# Patient Record
Sex: MALE | Race: WHITE | NOT HISPANIC OR LATINO | Employment: OTHER | URBAN - METROPOLITAN AREA
[De-identification: names, ages, dates, MRNs, and addresses within clinical notes are randomized per-mention and may not be internally consistent; named-entity substitution may affect disease eponyms.]

---

## 2021-03-19 ENCOUNTER — OFFICE VISIT (OUTPATIENT)
Dept: CARDIOLOGY | Facility: CLINIC | Age: 76
End: 2021-03-19

## 2021-03-19 VITALS
OXYGEN SATURATION: 98 % | DIASTOLIC BLOOD PRESSURE: 104 MMHG | HEART RATE: 62 BPM | WEIGHT: 183 LBS | HEIGHT: 70 IN | SYSTOLIC BLOOD PRESSURE: 200 MMHG | BODY MASS INDEX: 26.2 KG/M2

## 2021-03-19 DIAGNOSIS — I49.3 VENTRICULAR ECTOPY: ICD-10-CM

## 2021-03-19 DIAGNOSIS — I10 ESSENTIAL HYPERTENSION: Primary | ICD-10-CM

## 2021-03-19 PROBLEM — R94.31 ABNORMAL EKG: Status: ACTIVE | Noted: 2021-03-19

## 2021-03-19 PROCEDURE — 93000 ELECTROCARDIOGRAM COMPLETE: CPT | Performed by: INTERNAL MEDICINE

## 2021-03-19 PROCEDURE — 99204 OFFICE O/P NEW MOD 45 MIN: CPT | Performed by: INTERNAL MEDICINE

## 2021-03-19 RX ORDER — MAGNESIUM 30 MG
30 TABLET ORAL 2 TIMES DAILY
COMMUNITY
End: 2021-08-06

## 2021-03-19 RX ORDER — MULTIPLE VITAMINS W/ MINERALS TAB 9MG-400MCG
1 TAB ORAL DAILY
COMMUNITY

## 2021-03-19 RX ORDER — LORATADINE 10 MG/1
CAPSULE, LIQUID FILLED ORAL
COMMUNITY

## 2021-03-19 RX ORDER — CHLORAL HYDRATE 500 MG
CAPSULE ORAL
COMMUNITY
End: 2021-08-06

## 2021-03-19 NOTE — PROGRESS NOTES
Reason for consultation:Abnormal EKG     Referring physician: None    HPI. Hadley Astudillo is a 75 year old male who comes to the office for consultation regarding an abnormal EKG reading.     Mr. 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  The preoperative EKG shows isolated PVCs and 1 ventricular couplet.  He was subsequently advised undergo cardiac preoperative assessment.    On specific questioning, Mr. Astudillo denies any previous cardiac events.  He denies any prior history of ischemic coronary disease, previous myocardial infarction, unstable angina pectoris symptoms or congestive heart failure.  He has no prior history of cardiomyopathy, valvular heart disease or sustained dysrhythmias.  He denies effort related chest pain or other anginal equivalent symptoms.  He has no history of PND orthopnea or bipedal edema he has no palpitations, dizziness lightheadedness or near syncope.  He denies any decline in functional activity tolerance over the past 6 months.    Mr. Astudillo is actually quite functionally active for his age.  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       Past Medical History:   Diagnosis Date   • Abnormal ECG    • Hearing loss    • Hypertension        Past Surgical History:   Procedure Laterality Date   • TONSILLECTOMY         Family History   Problem Relation Age of Onset   • Hearing loss Father    • Heart disease Father    • No Known Problems Sister        Social History     Socioeconomic History   • Marital status:      Spouse name: Not on file   • Number of children: Not on file   • Years of education: Not on file   • Highest education level: Not on file   Tobacco Use   • Smoking status: Never Smoker   • Smokeless tobacco: Never Used   Vaping Use   • Vaping Use: Never used   Substance and Sexual Activity   • Alcohol use: Yes     Comment: rare   • Drug use: Never   • Sexual  "activity: Defer         Review of Systems   General: denies fever, chills, anorexia, weight loss  Eyes: denies blurring, diplopia  Ear/Nose/Throat: denies ear pain, nosebleeds, hoarseness  Cardiovascular: See HPI  Respiratory: denies excessive sputum, hemoptysis, wheezing  Gastrointestinal: denies nausea, vomiting, change in bowel habits, abdominal pain  Genitourinary: denies dysuria and hematuria  Musculoskeletal: denies back pain, joint pain, joint swelling, muscle cramps, weakness  Skin: denies rashes, itching, suspicious lesions  Neurologic: denies focal neuro deficits  Psychiatric: denies depression, anxiety  Endocrine: denies cold intolerance, heat intolerance  Hematologic/Lymphatic: denies abnormal bruising, bleeding  Allergic/Immunologic: denies urticaria or persistent infections    Allergies: Patient has no known allergies.    Current Meds:.  Current Outpatient Medications   Medication Sig Dispense Refill   • Garlic-DHA-EPA (GARLIC/EPA PO) Take  by mouth.     • Loratadine (Claritin) 10 MG capsule Take  by mouth.     • magnesium 30 MG tablet Take 30 mg by mouth 2 (Two) Times a Day.     • multivitamin with minerals tablet tablet Take 1 tablet by mouth Daily.     • Omega-3 Fatty Acids (fish oil) 1000 MG capsule capsule Take  by mouth Daily With Breakfast.       No current facility-administered medications for this visit.       Objective     VITAL SIGNS  BP (!) 200/104   Pulse 62   Ht 177.8 cm (70\")   Wt 83 kg (183 lb)   SpO2 98%   BMI 26.26 kg/m²      Physical Exam:  General:      Pleasant, well-nourished, well developed,, in no acute distress.    Head:     normocephalic and atraumatic.    Eyes:    PERRL/EOM intact, conjunctiva and sclera clear with out nystagmus.    Neck:    no jvd or bruits  Chest Wall:    no deformities   Lungs:    clear bilaterally to auscultation with adequate global airflow   Heart:    non-displaced PMI; regular rate and rhythm, normal S1, S2 without murmurs, rubs, or " gallops  Abdomen:  Soft, nontender without HSM  Msk:    no deformity; adequate R OM  Pulses:    pulses normal in all 4 extremities.    Extremities:    no clubbing, cyanosis, edema   Neurologic:    no focal sensory or motor deficits  Skin:    intact without lesions or rashes.    Psych:    alert and cooperative; normal mood and affect; normal attention span and concentration.             Results Review:      ECG 12 Lead    Date/Time: 3/19/2021 7:14 AM  Performed by: MERY Bray MD  Authorized by: MERY Bray MD   Comparison: compared with previous ECG from 2/22/2021  Similar to previous ECG  Rhythm: sinus rhythm  Ectopy: unifocal PVCs  Rate: normal  QRS axis: normal  Other findings: poor R wave progression    Clinical impression: abnormal EKG            ECHOCARDIOGRAM: No previous; will schedule    STRESS MYOVIEW: No previous; will schedule    CARDIAC CATHETERIZATION: No prior    OTHER: Recent chest x-ray 2/22/2021 showed no active disease    Imaging Results (Last 24 Hours)     ** No results found for the last 24 hours. **                        Assessment/Plan   #1 71-year-old white male referred for cardiac preoperative evaluation because of abnormal preop EKG showing isolated PVCs  --We will evaluate noninvasively including 24-hour Holter monitor, echocardiogram and stress Myoview exam    #2 accelerated/uncontrolled essential hypertension  --Initiate antihypertensive therapy using amlodipine 5 mg 1 p.o. 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 Astudillo will complete outpatient noninvasive cardiac studies and initiate antihypertensive therapy.  He will return for follow-up in 2 weeks to review studies and blood pressure log or return sooner for further symptoms as needed.  I fully anticipate he will be cleared to proceed with ENT surgery as he has no clinical evidence of unstable angina pectoris, acute microinfarction or congestive  heart failure at this time.        MERY Bray MD  3/19/2021 10:08 EDT    This report was generated using the Dragon voice recognition system.

## 2021-03-30 DIAGNOSIS — R94.31 ABNORMAL EKG: Primary | ICD-10-CM

## 2021-04-15 ENCOUNTER — HOSPITAL ENCOUNTER (OUTPATIENT)
Dept: NUCLEAR MEDICINE | Facility: HOSPITAL | Age: 76
Discharge: HOME | End: 2021-04-15

## 2021-04-15 ENCOUNTER — HOSPITAL ENCOUNTER (OUTPATIENT)
Dept: RESPIRATORY THERAPY | Facility: HOSPITAL | Age: 76
Discharge: HOME | End: 2021-04-15

## 2021-04-15 ENCOUNTER — HOSPITAL ENCOUNTER (OUTPATIENT)
Dept: CARDIOLOGY | Facility: HOSPITAL | Age: 76
Discharge: HOME | End: 2021-04-15

## 2021-04-15 VITALS
HEART RATE: 66 BPM | BODY MASS INDEX: 26.2 KG/M2 | DIASTOLIC BLOOD PRESSURE: 94 MMHG | SYSTOLIC BLOOD PRESSURE: 190 MMHG | WEIGHT: 183 LBS | HEIGHT: 70 IN

## 2021-04-15 DIAGNOSIS — R94.31 ABNORMAL EKG: ICD-10-CM

## 2021-04-15 PROCEDURE — 93306 TTE W/DOPPLER COMPLETE: CPT | Performed by: INTERNAL MEDICINE

## 2021-04-15 PROCEDURE — 78452 HT MUSCLE IMAGE SPECT MULT: CPT

## 2021-04-15 PROCEDURE — 0 TECHNETIUM SESTAMIBI: Performed by: INTERNAL MEDICINE

## 2021-04-15 PROCEDURE — 93225 XTRNL ECG REC<48 HRS REC: CPT

## 2021-04-15 PROCEDURE — 93017 CV STRESS TEST TRACING ONLY: CPT

## 2021-04-15 PROCEDURE — A9500 TC99M SESTAMIBI: HCPCS | Performed by: INTERNAL MEDICINE

## 2021-04-15 PROCEDURE — 93306 TTE W/DOPPLER COMPLETE: CPT

## 2021-04-15 PROCEDURE — 93018 CV STRESS TEST I&R ONLY: CPT | Performed by: INTERNAL MEDICINE

## 2021-04-15 PROCEDURE — 78452 HT MUSCLE IMAGE SPECT MULT: CPT | Performed by: INTERNAL MEDICINE

## 2021-04-15 RX ADMIN — TECHNETIUM TC 99M SESTAMIBI 1 DOSE: 1 INJECTION INTRAVENOUS at 08:42

## 2021-04-15 RX ADMIN — TECHNETIUM TC 99M SESTAMIBI 1 DOSE: 1 INJECTION INTRAVENOUS at 10:05

## 2021-04-23 LAB
BH CV ECHO MEAS - ACS: 1.5 CM
BH CV ECHO MEAS - AO MAX PG (FULL): 7.5 MMHG
BH CV ECHO MEAS - AO MAX PG: 11.9 MMHG
BH CV ECHO MEAS - AO MEAN PG (FULL): 2.8 MMHG
BH CV ECHO MEAS - AO MEAN PG: 5.5 MMHG
BH CV ECHO MEAS - AO ROOT AREA (BSA CORRECTED): 1.6
BH CV ECHO MEAS - AO ROOT AREA: 8.5 CM^2
BH CV ECHO MEAS - AO ROOT DIAM: 3.3 CM
BH CV ECHO MEAS - AO V2 MAX: 172.8 CM/SEC
BH CV ECHO MEAS - AO V2 MEAN: 106.9 CM/SEC
BH CV ECHO MEAS - AO V2 VTI: 35 CM
BH CV ECHO MEAS - ASC AORTA: 3.7 CM
BH CV ECHO MEAS - AVA(I,A): 3.4 CM^2
BH CV ECHO MEAS - AVA(I,D): 3.4 CM^2
BH CV ECHO MEAS - AVA(V,A): 2.5 CM^2
BH CV ECHO MEAS - AVA(V,D): 2.5 CM^2
BH CV ECHO MEAS - BSA(HAYCOCK): 2 M^2
BH CV ECHO MEAS - BSA: 2 M^2
BH CV ECHO MEAS - BZI_BMI: 26.3 KILOGRAMS/M^2
BH CV ECHO MEAS - BZI_METRIC_HEIGHT: 177.8 CM
BH CV ECHO MEAS - BZI_METRIC_WEIGHT: 83 KG
BH CV ECHO MEAS - EDV(CUBED): 86.4 ML
BH CV ECHO MEAS - EDV(MOD-SP2): 119 ML
BH CV ECHO MEAS - EDV(MOD-SP4): 145.8 ML
BH CV ECHO MEAS - EDV(TEICH): 88.7 ML
BH CV ECHO MEAS - EF(CUBED): 70.3 %
BH CV ECHO MEAS - EF(MOD-BP): 60 %
BH CV ECHO MEAS - EF(MOD-SP2): 56.5 %
BH CV ECHO MEAS - EF(MOD-SP4): 66.8 %
BH CV ECHO MEAS - EF(TEICH): 62.2 %
BH CV ECHO MEAS - ESV(CUBED): 25.6 ML
BH CV ECHO MEAS - ESV(MOD-SP2): 51.8 ML
BH CV ECHO MEAS - ESV(MOD-SP4): 48.5 ML
BH CV ECHO MEAS - ESV(TEICH): 33.5 ML
BH CV ECHO MEAS - FS: 33.3 %
BH CV ECHO MEAS - IVS/LVPW: 0.89
BH CV ECHO MEAS - IVSD: 1.4 CM
BH CV ECHO MEAS - LA DIMENSION(2D): 3.1 CM
BH CV ECHO MEAS - LV DIASTOLIC VOL/BSA (35-75): 72.5 ML/M^2
BH CV ECHO MEAS - LV MASS(C)D: 255.1 GRAMS
BH CV ECHO MEAS - LV MASS(C)DI: 126.9 GRAMS/M^2
BH CV ECHO MEAS - LV MAX PG: 4.5 MMHG
BH CV ECHO MEAS - LV MEAN PG: 2.6 MMHG
BH CV ECHO MEAS - LV SYSTOLIC VOL/BSA (12-30): 24.1 ML/M^2
BH CV ECHO MEAS - LV V1 MAX: 105.9 CM/SEC
BH CV ECHO MEAS - LV V1 MEAN: 77.1 CM/SEC
BH CV ECHO MEAS - LV V1 VTI: 29.9 CM
BH CV ECHO MEAS - LVIDD: 4.4 CM
BH CV ECHO MEAS - LVIDS: 2.9 CM
BH CV ECHO MEAS - LVOT AREA: 4 CM^2
BH CV ECHO MEAS - LVOT DIAM: 2.3 CM
BH CV ECHO MEAS - LVPWD: 1.5 CM
BH CV ECHO MEAS - MR MAX PG: 101.7 MMHG
BH CV ECHO MEAS - MR MAX VEL: 504.4 CM/SEC
BH CV ECHO MEAS - MV A MAX VEL: 82.9 CM/SEC
BH CV ECHO MEAS - MV DEC SLOPE: 336 CM/SEC^2
BH CV ECHO MEAS - MV DEC TIME: 0.23 SEC
BH CV ECHO MEAS - MV E MAX VEL: 78.2 CM/SEC
BH CV ECHO MEAS - MV E/A: 0.94
BH CV ECHO MEAS - MV MAX PG: 3.5 MMHG
BH CV ECHO MEAS - MV MEAN PG: 1.7 MMHG
BH CV ECHO MEAS - MV V2 MAX: 93.9 CM/SEC
BH CV ECHO MEAS - MV V2 MEAN: 59.5 CM/SEC
BH CV ECHO MEAS - MV V2 VTI: 29.4 CM
BH CV ECHO MEAS - MVA(VTI): 4.1 CM^2
BH CV ECHO MEAS - PA ACC TIME: 0.19 SEC
BH CV ECHO MEAS - PA MAX PG (FULL): 1.9 MMHG
BH CV ECHO MEAS - PA MAX PG: 4 MMHG
BH CV ECHO MEAS - PA MEAN PG (FULL): 1.5 MMHG
BH CV ECHO MEAS - PA MEAN PG: 2.8 MMHG
BH CV ECHO MEAS - PA PR(ACCEL): -8.2 MMHG
BH CV ECHO MEAS - PA V2 MAX: 99.9 CM/SEC
BH CV ECHO MEAS - PA V2 MEAN: 81.9 CM/SEC
BH CV ECHO MEAS - PA V2 VTI: 27.2 CM
BH CV ECHO MEAS - PI END-D VEL: 89.9 CM/SEC
BH CV ECHO MEAS - PI MAX PG: 5.8 MMHG
BH CV ECHO MEAS - PI MAX VEL: 117.1 CM/SEC
BH CV ECHO MEAS - PVA(I,A): 2.1 CM^2
BH CV ECHO MEAS - PVA(I,D): 2.1 CM^2
BH CV ECHO MEAS - PVA(V,A): 2.1 CM^2
BH CV ECHO MEAS - PVA(V,D): 2.1 CM^2
BH CV ECHO MEAS - QP/QS: 0.48
BH CV ECHO MEAS - RAP SYSTOLE: 3 MMHG
BH CV ECHO MEAS - RV MAX PG: 2.1 MMHG
BH CV ECHO MEAS - RV MEAN PG: 1.3 MMHG
BH CV ECHO MEAS - RV V1 MAX: 71.6 CM/SEC
BH CV ECHO MEAS - RV V1 MEAN: 55.3 CM/SEC
BH CV ECHO MEAS - RV V1 VTI: 19.9 CM
BH CV ECHO MEAS - RVDD: 3.2 CM
BH CV ECHO MEAS - RVOT AREA: 2.9 CM^2
BH CV ECHO MEAS - RVOT DIAM: 1.9 CM
BH CV ECHO MEAS - RVSP: 27.9 MMHG
BH CV ECHO MEAS - SI(AO): 147.3 ML/M^2
BH CV ECHO MEAS - SI(CUBED): 30.2 ML/M^2
BH CV ECHO MEAS - SI(LVOT): 59.8 ML/M^2
BH CV ECHO MEAS - SI(MOD-SP2): 33.4 ML/M^2
BH CV ECHO MEAS - SI(MOD-SP4): 48.4 ML/M^2
BH CV ECHO MEAS - SI(TEICH): 27.4 ML/M^2
BH CV ECHO MEAS - SV(AO): 296 ML
BH CV ECHO MEAS - SV(CUBED): 60.8 ML
BH CV ECHO MEAS - SV(LVOT): 120.2 ML
BH CV ECHO MEAS - SV(MOD-SP2): 67.2 ML
BH CV ECHO MEAS - SV(MOD-SP4): 97.3 ML
BH CV ECHO MEAS - SV(RVOT): 58.1 ML
BH CV ECHO MEAS - SV(TEICH): 55.1 ML
BH CV ECHO MEAS - TR MAX VEL: 249.5 CM/SEC
BH CV REST NUCLEAR ISOTOPE DOSE: 7.4 MCI
BH CV STRESS BP STAGE 1: NORMAL
BH CV STRESS BP STAGE 2: NORMAL
BH CV STRESS DURATION MIN STAGE 1: 3
BH CV STRESS DURATION MIN STAGE 2: 3
BH CV STRESS DURATION SEC STAGE 1: 0
BH CV STRESS DURATION SEC STAGE 2: 0
BH CV STRESS GRADE STAGE 1: 10
BH CV STRESS GRADE STAGE 2: 12
BH CV STRESS HR STAGE 1: 108
BH CV STRESS HR STAGE 2: 123
BH CV STRESS METS STAGE 1: 5
BH CV STRESS METS STAGE 2: 7.5
BH CV STRESS NUCLEAR ISOTOPE DOSE: 20.5 MCI
BH CV STRESS PROTOCOL 1: NORMAL
BH CV STRESS RECOVERY BP: NORMAL MMHG
BH CV STRESS RECOVERY HR: 71 BPM
BH CV STRESS SPEED STAGE 1: 1.7
BH CV STRESS SPEED STAGE 2: 2.5
BH CV STRESS STAGE 1: 1
BH CV STRESS STAGE 2: 2
LV EF NUC BP: 52 %
MAXIMAL PREDICTED HEART RATE: 145 BPM
PERCENT MAX PREDICTED HR: 84.83 %
STRESS BASELINE BP: NORMAL MMHG
STRESS BASELINE HR: 70 BPM
STRESS PERCENT HR: 100 %
STRESS POST ESTIMATED WORKLOAD: 7 METS
STRESS POST EXERCISE DUR MIN: 4 MIN
STRESS POST EXERCISE DUR SEC: 30 SEC
STRESS POST PEAK BP: NORMAL MMHG
STRESS POST PEAK HR: 123 BPM
STRESS TARGET HR: 123 BPM

## 2021-04-23 PROCEDURE — 93227 XTRNL ECG REC<48 HR R&I: CPT | Performed by: INTERNAL MEDICINE

## 2021-04-28 ENCOUNTER — TELEPHONE (OUTPATIENT)
Dept: CARDIOLOGY | Facility: CLINIC | Age: 76
End: 2021-04-28

## 2021-05-06 PROBLEM — Z87.898 HISTORY OF SYNCOPE: Status: ACTIVE | Noted: 2021-05-06

## 2021-05-07 ENCOUNTER — OFFICE VISIT (OUTPATIENT)
Dept: CARDIOLOGY | Facility: CLINIC | Age: 76
End: 2021-05-07

## 2021-05-07 VITALS
HEART RATE: 65 BPM | DIASTOLIC BLOOD PRESSURE: 78 MMHG | OXYGEN SATURATION: 98 % | SYSTOLIC BLOOD PRESSURE: 175 MMHG | BODY MASS INDEX: 26.34 KG/M2 | WEIGHT: 184 LBS | HEIGHT: 70 IN

## 2021-05-07 DIAGNOSIS — R94.31 ABNORMAL EKG: ICD-10-CM

## 2021-05-07 DIAGNOSIS — I10 ESSENTIAL HYPERTENSION: ICD-10-CM

## 2021-05-07 DIAGNOSIS — Z87.898 HISTORY OF SYNCOPE: Primary | ICD-10-CM

## 2021-05-07 PROCEDURE — 99214 OFFICE O/P EST MOD 30 MIN: CPT | Performed by: INTERNAL MEDICINE

## 2021-05-07 PROCEDURE — 93000 ELECTROCARDIOGRAM COMPLETE: CPT | Performed by: INTERNAL MEDICINE

## 2021-05-09 RX ORDER — AMLODIPINE BESYLATE 5 MG/1
5 TABLET ORAL DAILY
Qty: 30 TABLET | Refills: 3 | Status: SHIPPED | OUTPATIENT
Start: 2021-05-09 | End: 2021-08-06

## 2021-08-06 ENCOUNTER — OFFICE VISIT (OUTPATIENT)
Dept: CARDIOLOGY | Facility: CLINIC | Age: 76
End: 2021-08-06

## 2021-08-06 VITALS
BODY MASS INDEX: 25.05 KG/M2 | HEIGHT: 70 IN | HEART RATE: 59 BPM | SYSTOLIC BLOOD PRESSURE: 158 MMHG | DIASTOLIC BLOOD PRESSURE: 88 MMHG | OXYGEN SATURATION: 97 % | WEIGHT: 175 LBS

## 2021-08-06 DIAGNOSIS — I10 ESSENTIAL HYPERTENSION: Primary | ICD-10-CM

## 2021-08-06 PROCEDURE — 93000 ELECTROCARDIOGRAM COMPLETE: CPT | Performed by: INTERNAL MEDICINE

## 2021-08-06 PROCEDURE — 99213 OFFICE O/P EST LOW 20 MIN: CPT | Performed by: INTERNAL MEDICINE

## 2021-08-06 RX ORDER — MINERAL OIL AND WHITE PETROLATUM 30; 940 MG/G; MG/G
OINTMENT OPHTHALMIC
COMMUNITY
Start: 2021-07-12

## 2021-08-06 RX ORDER — MINOXIDIL 2 %
SOLUTION, NON-ORAL TOPICAL
COMMUNITY
Start: 2021-07-08

## 2021-08-06 RX ORDER — TOBRAMYCIN AND DEXAMETHASONE 3; 1 MG/ML; MG/ML
SUSPENSION/ DROPS OPHTHALMIC
COMMUNITY
Start: 2021-07-17

## 2021-08-06 RX ORDER — ACYCLOVIR 400 MG/1
TABLET ORAL
COMMUNITY
Start: 2021-06-22 | End: 2021-08-06

## 2021-08-06 NOTE — PROGRESS NOTES
Cardiology Office Visit Note      Referring physician:      Reason For Followup: HTN    HPI:  Hadley Astudillo is a 75 y.o. male who returns to the office for follow up on HTN. I added Norvasc 5mg at his list visit which he is tolerating well.    He denies effort related chest pain or other anginal equivalent symptoms.  He has no history of PND orthopnea or bipedal edema he has no palpitations, dizziness lightheadedness or near syncope.  He denies any decline in functional activity tolerance over the past 6 months. Mr. Astudillo is actually quite functionally active for his age.  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      Mr. Astudillo feels he is doing quite well at this time with no specific cardiopulmonary or other constitutional symptoms.  He is COVID-19 vaccinated.        Past Medical History:   Diagnosis Date   • Abnormal ECG    • Essential hypertension 5/7/2021   • Hearing loss    • Hypertension        Past Surgical History:   Procedure Laterality Date   • TONSILLECTOMY           Current Outpatient Medications:   •  [employer] Artificial Tears 1-0.3 % solution, INSTILL 3 4 DROPS INTO AFFECTED EYE(S) EVERY HOUR WHILE AWAKE, Disp: , Rfl:   •  multivitamin with minerals tablet tablet, Take 1 tablet by mouth Daily., Disp: , Rfl:   •  tobramycin-dexamethasone (TOBRADEX) 0.3-0.1 % ophthalmic suspension, INSTILL 3 4 DROPS TO SURGICAL EAR TWICE DAILY X 2WKS THEN DAILY UNTIL FOLLOW UP APPOINTMENT, Disp: , Rfl:   •  White Petrolatum-Mineral Oil (Systane Nighttime) ointment ophthalmic ointment, APPLY TO RIGHT EYE AT BEDTIME, Disp: , Rfl:   •  Loratadine (Claritin) 10 MG capsule, Take  by mouth., Disp: , Rfl:     Social History     Socioeconomic History   • Marital status:      Spouse name: Not on file   • Number of children: Not on file   • Years of education: Not on file   • Highest education level: Not on file   Tobacco Use   • Smoking status: Never  "Smoker   • Smokeless tobacco: Never Used   Vaping Use   • Vaping Use: Never used   Substance and Sexual Activity   • Alcohol use: Yes     Comment: rare   • Drug use: Never   • Sexual activity: Defer       Family History   Problem Relation Age of Onset   • Hearing loss Father    • Heart disease Father    • No Known Problems Sister          Review of Systems   General: denies fever, chills, anorexia, weight loss  Eyes: denies blurring, diplopia  Ear/Nose/Throat: denies ear pain, nosebleeds, hoarseness  Cardiovascular: See HPI  Respiratory: denies excessive sputum, hemoptysis, wheezing  Gastrointestinal: denies nausea, vomiting, change in bowel habits, abdominal pain  Genitourinary: Denies hematuria dysuria only modest nocturia, typically once per night  Musculoskeletal: Only modest generalized arthralgias though none are functionally limiting  Skin: denies rashes, itching, suspicious lesions  Neurologic: denies focal neuro deficits  Psychiatric: denies depression, anxiety  Endocrine: denies cold intolerance, heat intolerance  Hematologic/Lymphatic: denies abnormal bruising, bleeding  Allergic/Immunologic: denies urticaria or persistent infections      Objective     Visit Vitals  /88   Pulse 59   Ht 177.8 cm (70\")   Wt 79.4 kg (175 lb)   SpO2 97%   BMI 25.11 kg/m²           Physical Exam  General:      Pleasant, well-nourished, well developed,, in no acute distress.    Head:     normocephalic and atraumatic.    Eyes:    PERRL/EOM intact, conjunctiva and sclera clear with out nystagmus.    Neck:    no jvd or bruits  Chest Wall:    no deformities   Lungs:    clear bilaterally to auscultation with adequate global airflow   Heart:    non-displaced PMI; regular rate and rhythm, normal S1, S2 without murmurs, rubs, or gallops  Abdomen:  Soft, nontender without HSM  Msk:    no deformity; adequate R OM  Pulses:    pulses normal in all 4 extremities.    Extremities:    no clubbing, cyanosis, edema   Neurologic:    no " focal sensory or motor deficits  Skin:    intact without lesions or rashes.    Psych:    alert and cooperative; normal mood and affect; normal attention span and concentration.            ECG 12 Lead    Date/Time: 8/6/2021 10:55 AM  Performed by: MERY Bray MD  Authorized by: MERY Bray MD   Comparison: compared with previous ECG from 5/7/2021  Similar to previous ECG  Rhythm: sinus rhythm  Ectopy: infrequent PVCs  Q waves: II, III and aVF      Clinical impression: abnormal EKG  Comments: Cannot exclude inferior wall infarction with Q waves in 2 3 and aVF              Assessment:   Problems Addressed this Visit        Other    Essential hypertension - Primary  -Generally well regulated on current medication listed and reviewed in detail.  He is advised to continue with amlodipine 5 mg p.o. daily as previously prescribed    Abnormal EKG-sinus rhythm with 1 isolated PVC and inferior Q waves-unchanged      Diagnoses       Codes Comments    Essential hypertension    -  Primary ICD-10-CM: I10  ICD-9-CM: 401.9             Plan:  Cardiac leslie, Mr. Astudillo now appears hemodynamically stable well compensated on current medications.  He is advised to follow-up with his primary care physician return to see us should he develop additional cardiopulmonary issues in the future.    MERY Bray MD  8/16/2021 00:06 EDT    This report was generated using the Dragon voice recognition system.

## 2022-12-03 ENCOUNTER — HOSPITAL ENCOUNTER (EMERGENCY)
Facility: HOSPITAL | Age: 77
Discharge: HOME | End: 2022-12-03
Attending: EMERGENCY MEDICINE | Admitting: EMERGENCY MEDICINE

## 2022-12-03 ENCOUNTER — APPOINTMENT (OUTPATIENT)
Dept: CT IMAGING | Facility: HOSPITAL | Age: 77
End: 2022-12-03

## 2022-12-03 VITALS
RESPIRATION RATE: 20 BRPM | WEIGHT: 170.86 LBS | HEIGHT: 69 IN | SYSTOLIC BLOOD PRESSURE: 196 MMHG | TEMPERATURE: 98.6 F | DIASTOLIC BLOOD PRESSURE: 104 MMHG | OXYGEN SATURATION: 96 % | HEART RATE: 68 BPM | BODY MASS INDEX: 25.31 KG/M2

## 2022-12-03 DIAGNOSIS — S09.90XA MINOR CLOSED HEAD INJURY: ICD-10-CM

## 2022-12-03 DIAGNOSIS — I10 HYPERTENSION, UNSPECIFIED TYPE: ICD-10-CM

## 2022-12-03 DIAGNOSIS — S02.2XXA CLOSED DISPLACED FRACTURE OF NASAL BONE, INITIAL ENCOUNTER: ICD-10-CM

## 2022-12-03 DIAGNOSIS — R51.9 ACUTE FACIAL PAIN: ICD-10-CM

## 2022-12-03 DIAGNOSIS — S01.81XA FACIAL LACERATION, INITIAL ENCOUNTER: ICD-10-CM
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
## 2022-12-03 PROCEDURE — 90471 IMMUNIZATION ADMIN: CPT | Performed by: NURSE PRACTITIONER

## 2022-12-03 PROCEDURE — 90715 TDAP VACCINE 7 YRS/> IM: CPT | Performed by: NURSE PRACTITIONER

## 2022-12-03 PROCEDURE — 70450 CT HEAD/BRAIN W/O DYE: CPT

## 2022-12-03 PROCEDURE — 25010000002 TETANUS-DIPHTH-ACELL PERTUSSIS 5-2.5-18.5 LF-MCG/0.5 SUSPENSION PREFILLED SYRINGE: Performed by: NURSE PRACTITIONER

## 2022-12-03 PROCEDURE — 72125 CT NECK SPINE W/O DYE: CPT

## 2022-12-03 PROCEDURE — 70486 CT MAXILLOFACIAL W/O DYE: CPT

## 2022-12-03 PROCEDURE — 99283 EMERGENCY DEPT VISIT LOW MDM: CPT

## 2022-12-03 RX ORDER — LIDOCAINE HYDROCHLORIDE AND EPINEPHRINE 10; 10 MG/ML; UG/ML
10 INJECTION, SOLUTION INFILTRATION; PERINEURAL ONCE
Status: DISCONTINUED | OUTPATIENT
Start: 2022-12-03 | End: 2022-12-03 | Stop reason: SDUPTHER

## 2022-12-03 RX ORDER — CLONIDINE HYDROCHLORIDE 0.1 MG/1
0.1 TABLET ORAL ONCE
Status: COMPLETED | OUTPATIENT
Start: 2022-12-03 | End: 2022-12-03

## 2022-12-03 RX ORDER — LIDOCAINE HYDROCHLORIDE AND EPINEPHRINE 10; 10 MG/ML; UG/ML
5 INJECTION, SOLUTION INFILTRATION; PERINEURAL ONCE
Status: DISCONTINUED | OUTPATIENT
Start: 2022-12-03 | End: 2022-12-03 | Stop reason: HOSPADM

## 2022-12-03 RX ADMIN — CLONIDINE HYDROCHLORIDE 0.1 MG: 0.1 TABLET ORAL at 16:15

## 2022-12-03 RX ADMIN — TETANUS TOXOID, REDUCED DIPHTHERIA TOXOID AND ACELLULAR PERTUSSIS VACCINE, ADSORBED 0.5 ML: 5; 2.5; 8; 8; 2.5 SUSPENSION INTRAMUSCULAR at 15:44

## 2022-12-03 NOTE — ED PROVIDER NOTES
Subjective   History of Present Illness  Patient is a 77-year-old gentleman who comes in with his son at bedside who was burning some leaves- and tripped and fell and hit his head / face on a tree root and has  A large laceration above his right eye and abrasions and swelling to the right side of the face.  He states he did not lose consciousness he was able to make it back to the house where he called his son.  He denies any neck pain at this time.    He rates his pain as moderate.  He does not wish to have anything for pain.  He has no nausea no vomiting he states the pain is mainly in his nose        Review of Systems   Constitutional: Negative for chills, fatigue and fever.   HENT: Positive for facial swelling. Negative for congestion, tinnitus and trouble swallowing.         Nasal bone deformity   Eyes: Negative for photophobia, discharge and redness.   Respiratory: Negative for cough and shortness of breath.    Cardiovascular: Negative for chest pain and palpitations.   Gastrointestinal: Negative for abdominal pain, diarrhea, nausea and vomiting.   Genitourinary: Negative for dysuria, frequency and urgency.   Musculoskeletal: Negative for back pain, joint swelling and myalgias.   Skin: Positive for wound. Negative for rash.        Facial laceration facial abrasion   Neurological: Negative for dizziness and headaches.   Psychiatric/Behavioral: Negative for confusion.   All other systems reviewed and are negative.      Past Medical History:   Diagnosis Date   • Abnormal ECG    • Essential hypertension 5/7/2021   • Hearing loss    • Hypertension        No Known Allergies    Past Surgical History:   Procedure Laterality Date   • TONSILLECTOMY         Family History   Problem Relation Age of Onset   • Hearing loss Father    • Heart disease Father    • No Known Problems Sister        Social History     Socioeconomic History   • Marital status:    Tobacco Use   • Smoking status: Never   • Smokeless tobacco:  Never   Vaping Use   • Vaping Use: Never used   Substance and Sexual Activity   • Alcohol use: Yes     Comment: rare   • Drug use: Never   • Sexual activity: Defer           Objective   Physical Exam  Vitals reviewed.   Constitutional:       Appearance: Normal appearance. He is well-developed and normal weight.   HENT:      Head: Normocephalic. Abrasion, contusion and laceration present.      Jaw: There is normal jaw occlusion. No tenderness, swelling, pain on movement or malocclusion.        Nose: Nasal deformity, signs of injury and nasal tenderness present.      Right Nostril: No septal hematoma.      Left Nostril: No septal hematoma.      Right Turbinates: Swollen.      Left Turbinates: Swollen.      Right Sinus: Maxillary sinus tenderness present. No frontal sinus tenderness.      Left Sinus: No maxillary sinus tenderness or frontal sinus tenderness.      Mouth/Throat:      Mouth: Mucous membranes are moist.   Eyes:      Conjunctiva/sclera: Conjunctivae normal.      Pupils: Pupils are equal, round, and reactive to light.   Cardiovascular:      Rate and Rhythm: Normal rate and regular rhythm.      Heart sounds: Normal heart sounds.   Pulmonary:      Effort: Pulmonary effort is normal.      Breath sounds: Normal breath sounds.   Abdominal:      General: Bowel sounds are normal.      Palpations: Abdomen is soft.   Musculoskeletal:         General: Normal range of motion.      Cervical back: Normal range of motion and neck supple.   Skin:     General: Skin is warm and dry.      Capillary Refill: Capillary refill takes less than 2 seconds.   Neurological:      General: No focal deficit present.      Mental Status: He is alert and oriented to person, place, and time.      GCS: GCS eye subscore is 4. GCS verbal subscore is 5. GCS motor subscore is 6.      Cranial Nerves: Cranial nerves 2-12 are intact.      Motor: Motor function is intact.      Coordination: Coordination is intact.      Gait: Gait is intact.    Psychiatric:         Attention and Perception: Attention normal.         Mood and Affect: Mood normal.         Speech: Speech normal.         Behavior: Behavior normal. Behavior is cooperative.         Cognition and Memory: Cognition and memory normal.         Laceration Repair    Date/Time: 12/3/2022 4:52 PM  Performed by: Yelitza Patel APRN  Authorized by: Remington Borges MD     Consent:     Consent obtained:  Verbal    Consent given by:  Patient    Risks, benefits, and alternatives were discussed: yes      Risks discussed:  Pain, poor cosmetic result and poor wound healing  Universal protocol:     Procedure explained and questions answered to patient or proxy's satisfaction: yes      Site/side marked: yes      Immediately prior to procedure, a time out was called: yes      Patient identity confirmed:  Verbally with patient, arm band and hospital-assigned identification number  Anesthesia:     Anesthesia method:  Local infiltration    Local anesthetic:  Lidocaine 1% WITH epi  Laceration details:     Location:  Face    Face location:  Forehead    Length (cm):  6  Pre-procedure details:     Preparation:  Patient was prepped and draped in usual sterile fashion and imaging obtained to evaluate for foreign bodies  Exploration:     Limited defect created (wound extended): no      Hemostasis achieved with:  Direct pressure and epinephrine    Imaging obtained comment:  Ct exam negative    Imaging outcome: foreign body not noted      Wound exploration: wound explored through full range of motion and entire depth of wound visualized      Contaminated: no    Treatment:     Area cleansed with:  Povidone-iodine    Amount of cleaning:  Extensive    Irrigation solution:  Sterile saline    Irrigation volume:  250    Irrigation method:  Pressure wash    Debridement:  None    Undermining:  None    Layers/structures repaired:  Deep subcutaneous  Deep subcutaneous:     Suture size:  4-0    Suture material:  Vicryl    Suture  "technique:  Horizontal mattress    Number of sutures:  2  Skin repair:     Repair method:  Sutures    Suture size:  5-0    Suture material:  Nylon    Suture technique:  Simple interrupted    Number of sutures:  9  Approximation:     Approximation:  Close  Repair type:     Repair type:  Complex  Post-procedure details:     Dressing:  Antibiotic ointment    Procedure completion:  Tolerated well, no immediate complications               ED Course      BP (!) 196/104   Pulse 68   Temp 98.6 °F (37 °C)   Resp 20   Ht 175.3 cm (69\")   Wt 77.5 kg (170 lb 13.7 oz)   SpO2 96%   BMI 25.23 kg/m²   Labs Reviewed - No data to display  Medications   Tetanus-Diphth-Acell Pertussis (BOOSTRIX) injection 0.5 mL (0.5 mL Intramuscular Given 12/3/22 0784)   cloNIDine (CATAPRES) tablet 0.1 mg (0.1 mg Oral Given 12/3/22 1615)     No radiology results for the last day                                       MDM  Number of Diagnoses or Management Options  Acute facial pain  Closed displaced fracture of nasal bone, initial encounter  Facial laceration, initial encounter  Fall, initial encounter  Hypertension, unspecified type  Minor closed head injury  Diagnosis management comments: Patient had above exam and procedure as documented his blood pressure was treated here in the emergency room and came down nicely I spoke to him and his son at length about following up with primary care for further management of blood pressure and to keep the wound clean dry and covered to have the stitches out in 5 days and to watch for signs of infection we talked about his nasal bone fractures and the need to follow-up with primary care or ear nose and throat doctor for further evaluation treatment    Talked about head injury precautions and the need to return for any worsening symptoms he verbalized understood discharge instruction       Amount and/or Complexity of Data Reviewed  Tests in the radiology section of CPT®: reviewed    Risk of Complications, " Morbidity, and/or Mortality  Presenting problems: high  Diagnostic procedures: high  Management options: high    Patient Progress  Patient progress: improved      Final diagnoses:   Fall, initial encounter   Minor closed head injury   Facial laceration, initial encounter   Closed displaced fracture of nasal bone, initial encounter   Acute facial pain   Hypertension, unspecified type       ED Disposition  ED Disposition     ED Disposition   Discharge    Condition   Stable    Comment   --             Alfred Bray MD  [address] [place] [address] [phone]    In 5 days  For suture removal         Medication List      No changes were made to your prescriptions during this visit.          Yelitza Patel, APRN  12/06/22 0722

## 2022-12-03 NOTE — DISCHARGE INSTRUCTIONS
Today your blood pressure was extremely elevated you need to see primary care for evaluation and treatment of blood pressure    Keep your wound clean dry and covered with bacitracin watch for signs of infection and follow-up with Dr. Bray or return to the ER for any signs of infection    Call Dr. Bray for follow-up appointment and have the sutures removed in 5 days    Follow head injury precautions and return to the emergency room for any increased pain nausea vomiting fever chills or worsening symptoms    Use Tylenol and Motrin for discomfort

## 2022-12-08 ENCOUNTER — HOSPITAL ENCOUNTER (EMERGENCY)
Facility: HOSPITAL | Age: 77
Discharge: HOME | End: 2022-12-08
Attending: EMERGENCY MEDICINE | Admitting: EMERGENCY MEDICINE

## 2022-12-08 VITALS
WEIGHT: 168.87 LBS | HEART RATE: 78 BPM | HEIGHT: 70 IN | OXYGEN SATURATION: 98 % | SYSTOLIC BLOOD PRESSURE: 160 MMHG | DIASTOLIC BLOOD PRESSURE: 89 MMHG | TEMPERATURE: 98.1 F | BODY MASS INDEX: 24.18 KG/M2 | RESPIRATION RATE: 17 BRPM

## 2022-12-08 DIAGNOSIS — Z48.02 VISIT FOR SUTURE REMOVAL: Primary | ICD-10-CM

## 2022-12-08 PROCEDURE — 99202 OFFICE O/P NEW SF 15 MIN: CPT

## 2022-12-08 NOTE — ED PROVIDER NOTES
Subjective   History of Present Illness  Patient is a 77-year-old white male with history of hypertension who presents today for suture removal.  He states he was seen here 5 days ago after an injury to his face.  He states he had sutures placed over his right eyebrow.  He states he was told to come back in 5 days to have them removed.  He reports good wound healing without drainage or bleeding.  He denies fever or chills.        Review of Systems   Constitutional: Negative for fever.   Skin: Positive for wound.        Right eyebrow wound   All other systems reviewed and are negative.      Past Medical History:   Diagnosis Date   • Abnormal ECG    • Essential hypertension 5/7/2021   • Hearing loss    • Hypertension        No Known Allergies    Past Surgical History:   Procedure Laterality Date   • TONSILLECTOMY         Family History   Problem Relation Age of Onset   • Hearing loss Father    • Heart disease Father    • No Known Problems Sister        Social History     Socioeconomic History   • Marital status:    Tobacco Use   • Smoking status: Never   • Smokeless tobacco: Never   Vaping Use   • Vaping Use: Never used   Substance and Sexual Activity   • Alcohol use: Yes     Comment: rare   • Drug use: Never   • Sexual activity: Defer           Objective   Physical Exam  Vital signs and triage nurse note reviewed.  Constitutional: Awake, alert; well-developed and well-nourished. No acute distress is noted.  Cardiovascular: Regular rate and rhythm  Pulmonary: Respiratory effort regular nonlabored  Musculoskeletal: Independent range of motion of all extremities with no palpable tenderness or edema.  Neuro: Alert oriented x3, speech is clear and appropriate, GCS 15.    Skin: Flesh tone, warm, dry.  There is a well approximated well-healing wound noted over the right eyebrow.  There are 9 external sutures noted. There is no bleeding or drainage.  No significant tenderness to palpation.    Procedures           ED  Course                                           MDM  Number of Diagnoses or Management Options  Visit for suture removal  Diagnosis management comments: Patient presents for suture removal.  Sutures were placed here 5 days ago.  He reports good wound healing.  His exam reveals well approximated well-healing wound over the right eyebrow with 9 sutures.  These were removed by me without difficulty.  He is otherwise well-appearing, afebrile and in no distress.    Diagnosis and treatment plan discussed with patient.  Patient agreeable to plan.   I discussed findings with patient who voices understanding of discharge instructions, signs and symptoms requiring return to ED; discharged improved and in stable condition with follow up for re-evaluation.      Patient Progress  Patient progress: stable      Final diagnoses:   Visit for suture removal       ED Disposition  ED Disposition     ED Disposition   Discharge    Condition   Stable    Comment   --             Alfred Bray MD  [address] [place] [address] [place] [address] [phone]      As needed         Medication List      No changes were made to your prescriptions during this visit.          Neeru Freitas, SABRINA  12/08/22 0996

## 2022-12-08 NOTE — DISCHARGE INSTRUCTIONS
Continue to keep the wound clean and dry.  Continue to monitor for signs of infection.  May shower.  Follow-up with your primary care provider as needed.  Return for new or worsening symptoms.

## 2023-06-09 ENCOUNTER — APPOINTMENT (OUTPATIENT)
Dept: CT IMAGING | Facility: HOSPITAL | Age: 78
End: 2023-06-09
Payer: MEDICARE

## 2023-06-09 ENCOUNTER — HOSPITAL ENCOUNTER (OUTPATIENT)
Facility: HOSPITAL | Age: 78
Discharge: HOME | End: 2023-06-11
Attending: EMERGENCY MEDICINE | Admitting: STUDENT IN AN ORGANIZED HEALTH CARE EDUCATION/TRAINING PROGRAM
Payer: MEDICARE

## 2023-06-09 ENCOUNTER — ANESTHESIA (OUTPATIENT)
Dept: PERIOP | Facility: HOSPITAL | Age: 78
End: 2023-06-09
Payer: MEDICARE

## 2023-06-09 ENCOUNTER — ANESTHESIA EVENT (OUTPATIENT)
Dept: PERIOP | Facility: HOSPITAL | Age: 78
End: 2023-06-09
Payer: MEDICARE

## 2023-06-09 DIAGNOSIS — D72.829 LEUKOCYTOSIS, UNSPECIFIED TYPE: ICD-10-CM

## 2023-06-09 DIAGNOSIS — Z87.19 HISTORY OF ROBOT-ASSISTED REPAIR OF LEFT INGUINAL HERNIA: Chronic | ICD-10-CM

## 2023-06-09 DIAGNOSIS — L03.311 ABDOMINAL WALL CELLULITIS: ICD-10-CM

## 2023-06-09 DIAGNOSIS — N50.82 SCROTAL PAIN: ICD-10-CM

## 2023-06-09 DIAGNOSIS — Z98.890 HISTORY OF ROBOT-ASSISTED REPAIR OF LEFT INGUINAL HERNIA: Chronic | ICD-10-CM

## 2023-06-09 DIAGNOSIS — K40.30 INCARCERATED LEFT INGUINAL HERNIA: ICD-10-CM

## 2023-06-09 DIAGNOSIS — K40.90 LEFT INGUINAL HERNIA: Primary | ICD-10-CM

## 2023-06-09 LAB
ALBUMIN SERPL-MCNC: 4.4 G/DL (ref 3.5–5.2)
ALBUMIN/GLOB SERPL: 1.7 G/DL
ALP SERPL-CCNC: 63 U/L (ref 39–117)
ALT SERPL W P-5'-P-CCNC: 20 U/L (ref 1–41)
ANION GAP SERPL CALCULATED.3IONS-SCNC: 10 MMOL/L (ref 5–15)
AST SERPL-CCNC: 23 U/L (ref 1–40)
BILIRUB SERPL-MCNC: 0.6 MG/DL (ref 0–1.2)
BILIRUB UR QL STRIP: NEGATIVE
BUN SERPL-MCNC: 15 MG/DL (ref 8–23)
BUN/CREAT SERPL: 20 (ref 7–25)
CALCIUM SPEC-SCNC: 9.3 MG/DL (ref 8.6–10.5)
CHLORIDE SERPL-SCNC: 105 MMOL/L (ref 98–107)
CLARITY UR: CLEAR
CO2 SERPL-SCNC: 25 MMOL/L (ref 22–29)
COLOR UR: YELLOW
CREAT SERPL-MCNC: 0.75 MG/DL (ref 0.76–1.27)
DEPRECATED RDW RBC AUTO: 45.9 FL (ref 37–54)
EGFRCR SERPLBLD CKD-EPI 2021: 92.9 ML/MIN/1.73
EOSINOPHIL # BLD MANUAL: 0.4 10*3/MM3 (ref 0–0.4)
EOSINOPHIL NFR BLD MANUAL: 2 % (ref 0.3–6.2)
ERYTHROCYTE [DISTWIDTH] IN BLOOD BY AUTOMATED COUNT: 13.9 % (ref 12.3–15.4)
GLOBULIN UR ELPH-MCNC: 2.6 GM/DL
GLUCOSE BLDC GLUCOMTR-MCNC: 92 MG/DL (ref 70–105)
GLUCOSE SERPL-MCNC: 95 MG/DL (ref 65–99)
GLUCOSE UR STRIP-MCNC: NEGATIVE MG/DL
HCT VFR BLD AUTO: 47 % (ref 37.5–51)
HGB BLD-MCNC: 15.7 G/DL (ref 13–17.7)
HGB UR QL STRIP.AUTO: NEGATIVE
HOLD SPECIMEN: NORMAL
HOLD SPECIMEN: NORMAL
KETONES UR QL STRIP: NEGATIVE
KOH PREP NAIL: NORMAL
LAB AP CASE REPORT: NORMAL
LEUKOCYTE ESTERASE UR QL STRIP.AUTO: NEGATIVE
LIPASE SERPL-CCNC: 24 U/L (ref 13–60)
LYMPHOCYTES # BLD MANUAL: 14.14 10*3/MM3 (ref 0.7–3.1)
LYMPHOCYTES NFR BLD MANUAL: 3 % (ref 5–12)
MCH RBC QN AUTO: 30.2 PG (ref 26.6–33)
MCHC RBC AUTO-ENTMCNC: 33.4 G/DL (ref 31.5–35.7)
MCV RBC AUTO: 90.7 FL (ref 79–97)
MONOCYTES # BLD: 0.61 10*3/MM3 (ref 0.1–0.9)
NEUTROPHILS # BLD AUTO: 4.24 10*3/MM3 (ref 1.7–7)
NEUTROPHILS NFR BLD MANUAL: 19 % (ref 42.7–76)
NEUTS BAND NFR BLD MANUAL: 2 % (ref 0–5)
NITRITE UR QL STRIP: NEGATIVE
PATH REPORT.FINAL DX SPEC: NORMAL
PATHOLOGY REVIEW: YES
PH UR STRIP.AUTO: 8 [PH] (ref 5–8)
PLAT MORPH BLD: NORMAL
PLATELET # BLD AUTO: 277 10*3/MM3 (ref 140–450)
PMV BLD AUTO: 7.8 FL (ref 6–12)
POTASSIUM SERPL-SCNC: 4.3 MMOL/L (ref 3.5–5.2)
PROLYMPHOCYTES NFR BLD MANUAL: 4 % (ref 0–0)
PROT SERPL-MCNC: 7 G/DL (ref 6–8.5)
PROT UR QL STRIP: NEGATIVE
RBC # BLD AUTO: 5.18 10*6/MM3 (ref 4.14–5.8)
RBC MORPH BLD: NORMAL
SCAN SLIDE: NORMAL
SMUDGE CELLS BLD QL SMEAR: ABNORMAL
SODIUM SERPL-SCNC: 140 MMOL/L (ref 136–145)
SP GR UR STRIP: 1.02 (ref 1–1.03)
UROBILINOGEN UR QL STRIP: NORMAL
VARIANT LYMPHS NFR BLD MANUAL: 2 % (ref 0–5)
VARIANT LYMPHS NFR BLD MANUAL: 68 % (ref 19.6–45.3)
WBC NRBC COR # BLD: 20.2 10*3/MM3 (ref 3.4–10.8)
WHOLE BLOOD HOLD COAG: NORMAL
WHOLE BLOOD HOLD SPECIMEN: NORMAL

## 2023-06-09 PROCEDURE — G0378 HOSPITAL OBSERVATION PER HR: HCPCS

## 2023-06-09 PROCEDURE — 80053 COMPREHEN METABOLIC PANEL: CPT | Performed by: NURSE PRACTITIONER

## 2023-06-09 PROCEDURE — 83690 ASSAY OF LIPASE: CPT | Performed by: NURSE PRACTITIONER

## 2023-06-09 PROCEDURE — 25010000002 PIPERACILLIN SOD-TAZOBACTAM PER 1 G

## 2023-06-09 PROCEDURE — 49650 LAP ING HERNIA REPAIR INIT: CPT | Performed by: STUDENT IN AN ORGANIZED HEALTH CARE EDUCATION/TRAINING PROGRAM

## 2023-06-09 PROCEDURE — 99204 OFFICE O/P NEW MOD 45 MIN: CPT | Performed by: STUDENT IN AN ORGANIZED HEALTH CARE EDUCATION/TRAINING PROGRAM

## 2023-06-09 PROCEDURE — 96375 TX/PRO/DX INJ NEW DRUG ADDON: CPT

## 2023-06-09 PROCEDURE — 25010000002 HYDROMORPHONE 1 MG/ML SOLUTION

## 2023-06-09 PROCEDURE — 25010000002 CEFAZOLIN PER 500 MG: Performed by: STUDENT IN AN ORGANIZED HEALTH CARE EDUCATION/TRAINING PROGRAM

## 2023-06-09 PROCEDURE — 74177 CT ABD & PELVIS W/CONTRAST: CPT

## 2023-06-09 PROCEDURE — 81003 URINALYSIS AUTO W/O SCOPE: CPT | Performed by: NURSE PRACTITIONER

## 2023-06-09 PROCEDURE — 25010000002 ONDANSETRON PER 1 MG: Performed by: NURSE PRACTITIONER

## 2023-06-09 PROCEDURE — 96374 THER/PROPH/DIAG INJ IV PUSH: CPT

## 2023-06-09 PROCEDURE — 85025 COMPLETE CBC W/AUTO DIFF WBC: CPT | Performed by: NURSE PRACTITIONER

## 2023-06-09 PROCEDURE — 25510000001 IOPAMIDOL PER 1 ML: Performed by: EMERGENCY MEDICINE

## 2023-06-09 PROCEDURE — C1781 MESH (IMPLANTABLE): HCPCS | Performed by: STUDENT IN AN ORGANIZED HEALTH CARE EDUCATION/TRAINING PROGRAM

## 2023-06-09 PROCEDURE — 99284 EMERGENCY DEPT VISIT MOD MDM: CPT

## 2023-06-09 PROCEDURE — 49650 LAP ING HERNIA REPAIR INIT: CPT

## 2023-06-09 PROCEDURE — 25010000002 LORAZEPAM PER 2 MG

## 2023-06-09 PROCEDURE — 25010000002 ONDANSETRON PER 1 MG

## 2023-06-09 PROCEDURE — 25010000002 PROPOFOL 200 MG/20ML EMULSION

## 2023-06-09 PROCEDURE — S2900 ROBOTIC SURGICAL SYSTEM: HCPCS | Performed by: STUDENT IN AN ORGANIZED HEALTH CARE EDUCATION/TRAINING PROGRAM

## 2023-06-09 PROCEDURE — 82948 REAGENT STRIP/BLOOD GLUCOSE: CPT

## 2023-06-09 PROCEDURE — 25010000002 PIPERACILLIN SOD-TAZOBACTAM PER 1 G: Performed by: STUDENT IN AN ORGANIZED HEALTH CARE EDUCATION/TRAINING PROGRAM

## 2023-06-09 PROCEDURE — 85007 BL SMEAR W/DIFF WBC COUNT: CPT | Performed by: NURSE PRACTITIONER

## 2023-06-09 PROCEDURE — 25010000002 FENTANYL CITRATE (PF) 100 MCG/2ML SOLUTION

## 2023-06-09 PROCEDURE — 87220 TISSUE EXAM FOR FUNGI: CPT | Performed by: NURSE PRACTITIONER

## 2023-06-09 PROCEDURE — 25010000002 HYDROMORPHONE 1 MG/ML SOLUTION: Performed by: NURSE PRACTITIONER

## 2023-06-09 PROCEDURE — 99285 EMERGENCY DEPT VISIT HI MDM: CPT

## 2023-06-09 PROCEDURE — 25010000002 PHENYLEPHRINE 10 MG/ML SOLUTION

## 2023-06-09 DEVICE — MESH PROGRIP LAP S/FIXATING LPG1510: Type: IMPLANTABLE DEVICE | Site: ABDOMEN | Status: FUNCTIONAL

## 2023-06-09 DEVICE — ABSORBABLE WOUND CLOSURE DEVICE
Type: IMPLANTABLE DEVICE | Site: ABDOMEN | Status: FUNCTIONAL
Brand: V-LOC 180

## 2023-06-09 RX ORDER — PIPERACILLIN SODIUM, TAZOBACTAM SODIUM 3; .375 G/15ML; G/15ML
INJECTION, POWDER, LYOPHILIZED, FOR SOLUTION INTRAVENOUS AS NEEDED
Status: DISCONTINUED | OUTPATIENT
Start: 2023-06-09 | End: 2023-06-09 | Stop reason: SURG

## 2023-06-09 RX ORDER — PROCHLORPERAZINE EDISYLATE 5 MG/ML
10 INJECTION INTRAMUSCULAR; INTRAVENOUS ONCE AS NEEDED
Status: DISCONTINUED | OUTPATIENT
Start: 2023-06-09 | End: 2023-06-09 | Stop reason: HOSPADM

## 2023-06-09 RX ORDER — LORAZEPAM 2 MG/ML
0.5 INJECTION INTRAMUSCULAR ONCE
Status: COMPLETED | OUTPATIENT
Start: 2023-06-09 | End: 2023-06-09

## 2023-06-09 RX ORDER — ONDANSETRON 2 MG/ML
INJECTION INTRAMUSCULAR; INTRAVENOUS AS NEEDED
Status: DISCONTINUED | OUTPATIENT
Start: 2023-06-09 | End: 2023-06-09 | Stop reason: SURG

## 2023-06-09 RX ORDER — FENTANYL CITRATE 50 UG/ML
25 INJECTION, SOLUTION INTRAMUSCULAR; INTRAVENOUS
Status: DISCONTINUED | OUTPATIENT
Start: 2023-06-09 | End: 2023-06-09 | Stop reason: HOSPADM

## 2023-06-09 RX ORDER — ACETAMINOPHEN 325 MG/1
650 TABLET ORAL EVERY 6 HOURS PRN
Status: DISCONTINUED | OUTPATIENT
Start: 2023-06-09 | End: 2023-06-11 | Stop reason: HOSPADM

## 2023-06-09 RX ORDER — NALOXONE HCL 0.4 MG/ML
0.4 VIAL (ML) INJECTION AS NEEDED
Status: DISCONTINUED | OUTPATIENT
Start: 2023-06-09 | End: 2023-06-09 | Stop reason: HOSPADM

## 2023-06-09 RX ORDER — BUPIVACAINE HYDROCHLORIDE 2.5 MG/ML
INJECTION, SOLUTION INFILTRATION; PERINEURAL AS NEEDED
Status: DISCONTINUED | OUTPATIENT
Start: 2023-06-09 | End: 2023-06-09 | Stop reason: HOSPADM

## 2023-06-09 RX ORDER — ONDANSETRON 4 MG/1
4 TABLET, FILM COATED ORAL EVERY 6 HOURS PRN
Status: DISCONTINUED | OUTPATIENT
Start: 2023-06-09 | End: 2023-06-11 | Stop reason: HOSPADM

## 2023-06-09 RX ORDER — NEOSTIGMINE METHYLSULFATE 5 MG/5 ML
SYRINGE (ML) INTRAVENOUS AS NEEDED
Status: DISCONTINUED | OUTPATIENT
Start: 2023-06-09 | End: 2023-06-09 | Stop reason: SURG

## 2023-06-09 RX ORDER — IPRATROPIUM BROMIDE AND ALBUTEROL SULFATE 2.5; .5 MG/3ML; MG/3ML
3 SOLUTION RESPIRATORY (INHALATION) ONCE AS NEEDED
Status: DISCONTINUED | OUTPATIENT
Start: 2023-06-09 | End: 2023-06-09 | Stop reason: HOSPADM

## 2023-06-09 RX ORDER — SODIUM CHLORIDE, SODIUM LACTATE, POTASSIUM CHLORIDE, CALCIUM CHLORIDE 600; 310; 30; 20 MG/100ML; MG/100ML; MG/100ML; MG/100ML
1000 INJECTION, SOLUTION INTRAVENOUS CONTINUOUS
Status: DISCONTINUED | OUTPATIENT
Start: 2023-06-09 | End: 2023-06-11

## 2023-06-09 RX ORDER — ONDANSETRON 2 MG/ML
4 INJECTION INTRAMUSCULAR; INTRAVENOUS EVERY 6 HOURS PRN
Status: DISCONTINUED | OUTPATIENT
Start: 2023-06-09 | End: 2023-06-11 | Stop reason: HOSPADM

## 2023-06-09 RX ORDER — LABETALOL HYDROCHLORIDE 5 MG/ML
10 INJECTION, SOLUTION INTRAVENOUS
Status: DISCONTINUED | OUTPATIENT
Start: 2023-06-09 | End: 2023-06-11 | Stop reason: HOSPADM

## 2023-06-09 RX ORDER — SODIUM CHLORIDE 0.9 % (FLUSH) 0.9 %
10 SYRINGE (ML) INJECTION AS NEEDED
Status: DISCONTINUED | OUTPATIENT
Start: 2023-06-09 | End: 2023-06-09 | Stop reason: HOSPADM

## 2023-06-09 RX ORDER — FENTANYL CITRATE 50 UG/ML
50 INJECTION, SOLUTION INTRAMUSCULAR; INTRAVENOUS
Status: DISCONTINUED | OUTPATIENT
Start: 2023-06-09 | End: 2023-06-09 | Stop reason: HOSPADM

## 2023-06-09 RX ORDER — ENOXAPARIN SODIUM 100 MG/ML
40 INJECTION SUBCUTANEOUS
Status: DISCONTINUED | OUTPATIENT
Start: 2023-06-10 | End: 2023-06-09

## 2023-06-09 RX ORDER — PROPOFOL 10 MG/ML
INJECTION, EMULSION INTRAVENOUS AS NEEDED
Status: DISCONTINUED | OUTPATIENT
Start: 2023-06-09 | End: 2023-06-09 | Stop reason: SURG

## 2023-06-09 RX ORDER — HYDRALAZINE HYDROCHLORIDE 20 MG/ML
5 INJECTION INTRAMUSCULAR; INTRAVENOUS
Status: DISCONTINUED | OUTPATIENT
Start: 2023-06-09 | End: 2023-06-09 | Stop reason: HOSPADM

## 2023-06-09 RX ORDER — HYDROCODONE BITARTRATE AND ACETAMINOPHEN 7.5; 325 MG/1; MG/1
1 TABLET ORAL ONCE AS NEEDED
Status: DISCONTINUED | OUTPATIENT
Start: 2023-06-09 | End: 2023-06-09 | Stop reason: HOSPADM

## 2023-06-09 RX ORDER — SODIUM CHLORIDE 9 MG/ML
40 INJECTION, SOLUTION INTRAVENOUS AS NEEDED
Status: DISCONTINUED | OUTPATIENT
Start: 2023-06-09 | End: 2023-06-11 | Stop reason: HOSPADM

## 2023-06-09 RX ORDER — FLUMAZENIL 0.1 MG/ML
0.1 INJECTION INTRAVENOUS AS NEEDED
Status: DISCONTINUED | OUTPATIENT
Start: 2023-06-09 | End: 2023-06-09 | Stop reason: HOSPADM

## 2023-06-09 RX ORDER — EPHEDRINE SULFATE 5 MG/ML
INJECTION INTRAVENOUS AS NEEDED
Status: DISCONTINUED | OUTPATIENT
Start: 2023-06-09 | End: 2023-06-09 | Stop reason: SURG

## 2023-06-09 RX ORDER — LABETALOL HYDROCHLORIDE 5 MG/ML
5 INJECTION, SOLUTION INTRAVENOUS
Status: DISCONTINUED | OUTPATIENT
Start: 2023-06-09 | End: 2023-06-09 | Stop reason: HOSPADM

## 2023-06-09 RX ORDER — SODIUM CHLORIDE 0.9 % (FLUSH) 0.9 %
10 SYRINGE (ML) INJECTION AS NEEDED
Status: DISCONTINUED | OUTPATIENT
Start: 2023-06-09 | End: 2023-06-11 | Stop reason: HOSPADM

## 2023-06-09 RX ORDER — FENTANYL CITRATE 50 UG/ML
INJECTION, SOLUTION INTRAMUSCULAR; INTRAVENOUS AS NEEDED
Status: DISCONTINUED | OUTPATIENT
Start: 2023-06-09 | End: 2023-06-09 | Stop reason: SURG

## 2023-06-09 RX ORDER — ACETAMINOPHEN 500 MG
1000 TABLET ORAL EVERY 8 HOURS
Status: DISCONTINUED | OUTPATIENT
Start: 2023-06-09 | End: 2023-06-09

## 2023-06-09 RX ORDER — SODIUM CHLORIDE 0.9 % (FLUSH) 0.9 %
10 SYRINGE (ML) INJECTION EVERY 12 HOURS SCHEDULED
Status: DISCONTINUED | OUTPATIENT
Start: 2023-06-09 | End: 2023-06-11 | Stop reason: HOSPADM

## 2023-06-09 RX ORDER — LIDOCAINE HYDROCHLORIDE 20 MG/ML
INJECTION, SOLUTION EPIDURAL; INFILTRATION; INTRACAUDAL; PERINEURAL AS NEEDED
Status: DISCONTINUED | OUTPATIENT
Start: 2023-06-09 | End: 2023-06-09 | Stop reason: SURG

## 2023-06-09 RX ORDER — PHENYLEPHRINE HYDROCHLORIDE 10 MG/ML
INJECTION INTRAVENOUS AS NEEDED
Status: DISCONTINUED | OUTPATIENT
Start: 2023-06-09 | End: 2023-06-09 | Stop reason: SURG

## 2023-06-09 RX ORDER — ONDANSETRON 2 MG/ML
4 INJECTION INTRAMUSCULAR; INTRAVENOUS ONCE AS NEEDED
Status: DISCONTINUED | OUTPATIENT
Start: 2023-06-09 | End: 2023-06-09 | Stop reason: HOSPADM

## 2023-06-09 RX ORDER — OXYCODONE HYDROCHLORIDE 5 MG/1
5 TABLET ORAL EVERY 4 HOURS PRN
Status: DISCONTINUED | OUTPATIENT
Start: 2023-06-09 | End: 2023-06-11 | Stop reason: HOSPADM

## 2023-06-09 RX ORDER — NITROGLYCERIN 0.4 MG/1
0.4 TABLET SUBLINGUAL
Status: DISCONTINUED | OUTPATIENT
Start: 2023-06-09 | End: 2023-06-11 | Stop reason: HOSPADM

## 2023-06-09 RX ORDER — ROCURONIUM BROMIDE 10 MG/ML
INJECTION, SOLUTION INTRAVENOUS AS NEEDED
Status: DISCONTINUED | OUTPATIENT
Start: 2023-06-09 | End: 2023-06-09 | Stop reason: SURG

## 2023-06-09 RX ORDER — POLYETHYLENE GLYCOL 3350 17 G/17G
17 POWDER, FOR SOLUTION ORAL DAILY
Status: DISCONTINUED | OUTPATIENT
Start: 2023-06-10 | End: 2023-06-11 | Stop reason: HOSPADM

## 2023-06-09 RX ORDER — DIPHENHYDRAMINE HYDROCHLORIDE 50 MG/ML
12.5 INJECTION INTRAMUSCULAR; INTRAVENOUS ONCE AS NEEDED
Status: DISCONTINUED | OUTPATIENT
Start: 2023-06-09 | End: 2023-06-09 | Stop reason: HOSPADM

## 2023-06-09 RX ORDER — GLYCOPYRROLATE 0.2 MG/ML
INJECTION INTRAMUSCULAR; INTRAVENOUS AS NEEDED
Status: DISCONTINUED | OUTPATIENT
Start: 2023-06-09 | End: 2023-06-09 | Stop reason: SURG

## 2023-06-09 RX ORDER — ONDANSETRON 2 MG/ML
4 INJECTION INTRAMUSCULAR; INTRAVENOUS ONCE
Status: COMPLETED | OUTPATIENT
Start: 2023-06-09 | End: 2023-06-09

## 2023-06-09 RX ADMIN — CEFAZOLIN 2 G: 2 INJECTION, POWDER, FOR SOLUTION INTRAMUSCULAR; INTRAVENOUS at 15:12

## 2023-06-09 RX ADMIN — HYDROMORPHONE HYDROCHLORIDE 0.4 MG: 1 INJECTION, SOLUTION INTRAMUSCULAR; INTRAVENOUS; SUBCUTANEOUS at 16:48

## 2023-06-09 RX ADMIN — EPHEDRINE SULFATE 10 MG: 5 INJECTION INTRAVENOUS at 15:28

## 2023-06-09 RX ADMIN — PIPERACILLIN AND TAZOBACTAM 3.38 G: 3; .375 INJECTION, POWDER, LYOPHILIZED, FOR SOLUTION INTRAVENOUS at 22:00

## 2023-06-09 RX ADMIN — HYDROMORPHONE HYDROCHLORIDE 0.5 MG: 1 INJECTION, SOLUTION INTRAMUSCULAR; INTRAVENOUS; SUBCUTANEOUS at 11:45

## 2023-06-09 RX ADMIN — SODIUM CHLORIDE, POTASSIUM CHLORIDE, SODIUM LACTATE AND CALCIUM CHLORIDE 1000 ML: 600; 310; 30; 20 INJECTION, SOLUTION INTRAVENOUS at 14:57

## 2023-06-09 RX ADMIN — PIPERACILLIN AND TAZOBACTAM 3.38 G: 3; .375 INJECTION, POWDER, LYOPHILIZED, FOR SOLUTION INTRAVENOUS at 15:12

## 2023-06-09 RX ADMIN — ONDANSETRON 4 MG: 2 INJECTION INTRAMUSCULAR; INTRAVENOUS at 16:52

## 2023-06-09 RX ADMIN — PROPOFOL 150 MG: 10 INJECTION, EMULSION INTRAVENOUS at 15:08

## 2023-06-09 RX ADMIN — Medication 10 ML: at 22:09

## 2023-06-09 RX ADMIN — PHENYLEPHRINE HYDROCHLORIDE 100 MCG: 10 INJECTION INTRAVENOUS at 15:23

## 2023-06-09 RX ADMIN — ONDANSETRON 4 MG: 2 INJECTION INTRAMUSCULAR; INTRAVENOUS at 11:45

## 2023-06-09 RX ADMIN — SODIUM CHLORIDE, POTASSIUM CHLORIDE, SODIUM LACTATE AND CALCIUM CHLORIDE 1000 ML: 600; 310; 30; 20 INJECTION, SOLUTION INTRAVENOUS at 11:45

## 2023-06-09 RX ADMIN — ROCURONIUM BROMIDE 10 MG: 10 INJECTION, SOLUTION INTRAVENOUS at 16:21

## 2023-06-09 RX ADMIN — LIDOCAINE HYDROCHLORIDE 80 MG: 20 INJECTION, SOLUTION EPIDURAL; INFILTRATION; INTRACAUDAL; PERINEURAL at 15:08

## 2023-06-09 RX ADMIN — SODIUM CHLORIDE, POTASSIUM CHLORIDE, SODIUM LACTATE AND CALCIUM CHLORIDE 1000 ML: 600; 310; 30; 20 INJECTION, SOLUTION INTRAVENOUS at 17:44

## 2023-06-09 RX ADMIN — FENTANYL CITRATE 100 MCG: 50 INJECTION, SOLUTION INTRAMUSCULAR; INTRAVENOUS at 15:08

## 2023-06-09 RX ADMIN — IOPAMIDOL 100 ML: 755 INJECTION, SOLUTION INTRAVENOUS at 12:44

## 2023-06-09 RX ADMIN — PHENYLEPHRINE HYDROCHLORIDE 100 MCG: 10 INJECTION INTRAVENOUS at 15:19

## 2023-06-09 RX ADMIN — HYDROMORPHONE HYDROCHLORIDE 0.6 MG: 1 INJECTION, SOLUTION INTRAMUSCULAR; INTRAVENOUS; SUBCUTANEOUS at 17:01

## 2023-06-09 RX ADMIN — Medication 5 MG: at 16:52

## 2023-06-09 RX ADMIN — GLYCOPYRROLATE 0.8 MCG: 0.2 INJECTION INTRAMUSCULAR; INTRAVENOUS at 16:52

## 2023-06-09 RX ADMIN — ROCURONIUM BROMIDE 50 MG: 10 INJECTION, SOLUTION INTRAVENOUS at 15:09

## 2023-06-09 RX ADMIN — LORAZEPAM 0.5 MG: 2 INJECTION INTRAMUSCULAR; INTRAVENOUS at 17:11

## 2023-06-09 NOTE — CONSULTS
General Surgery Consult Note      Name: Hadley Astudillo ADMIT: 2023   : 1945  PCP: Mary Reese APRN    MRN: [id number] LOS: 0 days   AGE/SEX: 77 y.o. male  ROOM:    [facility]      Patient Care Team:  Mary Reese APRN as PCP - General (Nurse Practitioner)  Chief Complaint   Patient presents with   • Groin Swelling     Swelling to left testicle lower abd pain started 2 weeks ago with bulge in abd and now down to testicles.       Subjective   77-year-old gentleman with history of poorly controlled hypertension, no previous abdominal surgical history who presents with 1 week of worsening diffuse abdominal pain and tightness.  He denies nausea or vomiting.  He noticed a bulge in his left groin that he does not think was there before.  CT abdomen and pelvis with incarcerated omental fat and a large left inguinal hernia.  Platelet cell count 20.  Otherwise hemodynamically normal.  Suspect he has an incarcerated hernia with possible necrotic omental fat.    Past Medical History:   Diagnosis Date   • Abnormal ECG    • Essential hypertension 2021   • Hearing loss    • Hypertension      Past Surgical History:   Procedure Laterality Date   • TONSILLECTOMY       Family History   Problem Relation Age of Onset   • Hearing loss Father    • Heart disease Father    • No Known Problems Sister      Social History     Tobacco Use   • Smoking status: Never   • Smokeless tobacco: Never   Vaping Use   • Vaping Use: Never used   Substance Use Topics   • Alcohol use: Yes     Comment: rare   • Drug use: Never     (Not in a hospital admission)    ceFAZolin, 2 g, Intravenous, Once  [MAR Hold] HYDROmorphone, 1 mg, Intravenous, Once         •  [MAR Hold] sodium chloride  Patient has no known allergies.    Review of Systems   Constitutional: Negative for chills and fever.   HENT: Negative for rhinorrhea and trouble swallowing.    Eyes: Negative for blurred vision and double vision.   Respiratory: Negative for  "cough and shortness of breath.    Cardiovascular: Negative for chest pain and palpitations.   Gastrointestinal: Negative for abdominal distention and abdominal pain.   Musculoskeletal: Negative for back pain and myalgias.   Skin: Negative for color change and dry skin.   Neurological: Negative for headache and confusion.   Psychiatric/Behavioral: Negative for agitation and hallucinations.        Objective     Vital Signs and Labs:  Vital Signs Patient Vitals for the past 24 hrs:   BP Temp Temp src Pulse Resp SpO2 Height Weight   06/09/23 1332 (!) 160/105 -- -- 54 -- 97 % -- --   06/09/23 1217 (!) 197/111 -- -- 60 -- 92 % -- --   06/09/23 1051 (!) 219/97 -- -- -- -- -- -- --   06/09/23 1049 -- 97.5 °F (36.4 °C) Oral 67 18 93 % 177.8 cm (70\") 84 kg (185 lb 3 oz)     I/O:  No intake/output data recorded.    Physical Exam:  Physical Exam  Constitutional:       General: He is not in acute distress.     Appearance: Normal appearance. He is not ill-appearing.   HENT:      Head: Normocephalic and atraumatic.      Right Ear: External ear normal.      Left Ear: External ear normal.   Eyes:      Extraocular Movements: Extraocular movements intact.      Conjunctiva/sclera: Conjunctivae normal.   Cardiovascular:      Rate and Rhythm: Normal rate and regular rhythm.   Pulmonary:      Effort: Pulmonary effort is normal. No respiratory distress.   Abdominal:      General: There is no distension.      Palpations: Abdomen is soft.      Tenderness: There is no abdominal tenderness.   Musculoskeletal:         General: No swelling or deformity.   Skin:     General: Skin is warm and dry.   Neurological:      Mental Status: He is alert and oriented to person, place, and time. Mental status is at baseline.         CBC    Results from last 7 days   Lab Units 06/09/23  1117   WBC 10*3/mm3 20.20*   HEMOGLOBIN g/dL 15.7   PLATELETS 10*3/mm3 277     BMP   Results from last 7 days   Lab Units 06/09/23  1117   SODIUM mmol/L 140   POTASSIUM mmol/L " 4.3   CHLORIDE mmol/L 105   CO2 mmol/L 25.0   BUN mg/dL 15   CREATININE mg/dL 0.75*   GLUCOSE mg/dL 95     Radiology(recent) CT Abdomen Pelvis With Contrast    Result Date: 6/9/2023  Impression: 1. Large fat-containing left inguinal hernia extending to the scrotal sac. There is strandy density within the herniated fat which can be seen with ischemia/inflammation. There is a left hydrocele. 2. There is a small fat density right inguinal hernia. 3. Enlarged prostate gland presumably secondary to BPH. 4. Mild colonic diverticulosis without evidence of acute diverticulitis. 5. 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 6. Partially imaged fat density mass in the left rectus muscle consistent with intramuscular hematoma. 7. Additional findings as noted above. Electronically Signed: Brannon Freeman  6/9/2023 1:02 PM EDT  Workstation ID: [id number]      I reviewed the patient's new clinical results.    Assessment & Plan       * No active hospital problems. *      77-year-old gentleman with history of poorly controlled hypertension, no previous abdominal surgical history who presents with 1 week of worsening diffuse abdominal pain and tightness.  He denies nausea or vomiting.  He noticed a bulge in his left groin that he does not think was there before.  CT abdomen and pelvis with incarcerated omental fat and a large left inguinal hernia.  Platelet cell count 20.  Otherwise hemodynamically normal.  Suspect he has an incarcerated hernia with possible necrotic omental fat.  Plan for attempted robotic left inguinal hernia repair with permanent mesh placement, if large amount of necrotic fat may need biologic mesh.  If unable to repair this robotically will need to convert to open inguinal hernia repair, discussed all this with patient including risk benefits and alternatives and he elected to proceed.  I discussed risk benefits and alternatives to robotic inguinal hernia repair, including bowel injury, mesh infection requiring  mesh excision, recurrence, bleeding, injury to testicular vessels causing pain and possible need for orchiectomy, injury to vas deferens causing decrease in fertility rate, and chronic inguinal pain resulting from nerve injury and patient elected to proceed.       This note was created using Dragon Voice Recognition software.    Bowen Willingham MD  06/09/23  14:20 EDT

## 2023-06-09 NOTE — ANESTHESIA PREPROCEDURE EVALUATION
Anesthesia Evaluation     Patient summary reviewed and Nursing notes reviewed   no history of anesthetic complications:   NPO Solid Status: > 8 hours  NPO Liquid Status: > 8 hours           Airway   Mallampati: II  TM distance: >3 FB  Neck ROM: full  Dental - normal exam     Pulmonary - normal exam   (-) not a smoker  Cardiovascular - normal exam    ECG reviewed    (+) hypertension  (-) past MI    ROS comment: Negative stress 2021    TTE 2021  Interpretation Summary    · Left ventricular wall thickness is consistent with mild concentric hypertrophy.  · Left ventricular ejection fraction appears to be 61 - 65%. Left ventricular systolic function is normal.  · The right ventricular cavity is mild to moderately dilated.      Neuro/Psych  (-) CVA  GI/Hepatic/Renal/Endo      ROS Comment: Incarcerated hernia    Musculoskeletal (-) negative ROS    Abdominal    Substance History      OB/GYN          Other - negative ROS                       Anesthesia Plan    ASA 3     general     intravenous induction     Anesthetic plan, risks, benefits, and alternatives have been provided, discussed and informed consent has been obtained with: patient.    Plan discussed with CAA and CRNA.    CODE STATUS:

## 2023-06-09 NOTE — H&P
[facility] Medicine Services      Patient Name: Hadley Astudillo  : 1945  MRN: [id number]  Primary Care Physician:  Mary Reese APRN  Date of admission: 2023      Subjective      Chief Complaint: abdominal pain    History of Present Illness: Hadley Astudillo is a 77 y.o. male with PMHx of HTN who presented to [facility] on 2023 complaining of abdominal pain.  Admits to lower abdominal pain radiating down to left testicle worsening over the past 2-3 days complicated by left groin bulge.  Denies chest pain, vomiting, dyspnea, hemoptysis, diarrhea.  As symptoms did not improved he presented to [facility].    In the Ed, vitals 97.7, Hr 58, RR 12, /97, 95 Ra.  Labs notable for wbc 20.2.  CT a/p showed large fat containing left inguinal hernia extending to the scrotal sac with strandy density within herniated cat seen with ischemia/inflammation complicated by left hydrocele.  Surgery contacted and patient to be taken to OR.  Hospitalist will admit for medical management.      ROS   12 point ROS reviewed and negative except as mentioned above      Personal History     Past Medical History:   Diagnosis Date   • Abnormal ECG    • Essential hypertension 2021   • Hearing loss    • Hypertension        Past Surgical History:   Procedure Laterality Date   • TONSILLECTOMY         Family History: family history includes Hearing loss in his father; Heart disease in his father; No Known Problems in his sister. Otherwise pertinent FHx was reviewed and not pertinent to current issue.    Social History:  reports that he has never smoked. He has never used smokeless tobacco. He reports current alcohol use. He reports that he does not use drugs.    Home Medications:  Prior to Admission Medications     Prescriptions Last Dose Informant Patient Reported? Taking?    [employer] Artificial Tears 1-0.3 % solution   Yes No    Loratadine (Claritin) 10 MG capsule  Self Yes No    Take  by mouth.     multivitamin with minerals tablet tablet  Self Yes No    Take 1 tablet by mouth Daily.    tobramycin-dexamethasone (TOBRADEX) 0.3-0.1 % ophthalmic suspension   Yes No    White Petrolatum-Mineral Oil (Systane Nighttime) ointment ophthalmic ointment   Yes No    APPLY TO RIGHT EYE AT BEDTIME            Allergies:  No Known Allergies    Objective      Vitals:   Temp:  [97.4 °F (36.3 °C)-98.1 °F (36.7 °C)] 97.7 °F (36.5 °C)  Heart Rate:  [] 58  Resp:  [10-18] 12  BP: (102-219)/() 127/78  Flow (L/min):  [4-10] 10    Physical Exam  Constitutional:       General: He is not in acute distress.     Appearance: Normal appearance. He is not toxic-appearing.   HENT:      Head: Normocephalic and atraumatic.      Nose: Nose normal. No congestion.      Mouth/Throat:      Pharynx: Oropharynx is clear. No oropharyngeal exudate.   Eyes:      General: No scleral icterus.  Cardiovascular:      Rate and Rhythm: Normal rate and regular rhythm.      Heart sounds: No murmur heard.    No friction rub. No gallop.   Pulmonary:      Effort: No respiratory distress.      Breath sounds: No wheezing or rales.   Abdominal:      General: There is no distension.      Tenderness: There is no abdominal tenderness. There is no guarding.   Musculoskeletal:         General: No swelling or deformity.      Cervical back: Normal range of motion. No rigidity.      Right lower leg: No edema.      Left lower leg: No edema.   Skin:     Coloration: Skin is not jaundiced.      Findings: No bruising or lesion.      Comments: Laparoscopic incisions healing without issue   Neurological:      General: No focal deficit present.      Mental Status: He is alert and oriented to person, place, and time.      Motor: No weakness.          Result Review    Result Review:  I have personally reviewed the results from the time of this admission to 6/9/2023 19:37 EDT and agree with these findings:  [x]  Laboratory  []  Microbiology  [x]  Radiology  []  EKG/Telemetry    []  Cardiology/Vascular   []  Pathology  []  Old records  []  Other:        Assessment & Plan        Active Hospital Problems:  There are no active hospital problems to display for this patient.    Plan:     #Incarcerated inguinal Hernia    - CT a/p showed large fat containing left inguinal hernia extending to the scrotal sac with strandy density within herniated cat seen with ischemia/inflammation complicated by left hydrocele    - surgery consulted, suspects hernia is incarcerated with possible necrotic omental fat    - POD #0 laparoscopic left inguinal hernia repair with robot     - Zosyn, pharm to dose    - diet per surgery    - pain control per surgery    - wbc 20.2, trend    - check lactic acid      #HTN Urgency    - /97 > 127/78    - suspect elevation 2/2 pain and hernia    - Labetalol IV PRN with parameters    #BPH    - Enalarged prostate on CT a/p    - consider starting flomax    #Leukocytosis     - wbc 20.2    - suspect related to hernia, possible ishcemia    - cover with Zosyn, pharm to dose    - Patient afebrile    #Diverticulosis    - CT a/p shows mild colonic diverticulosis without evidence of diverticulitis      #Intramuscular hematoma    - CT a/p shows partially imaged fat density mass in the left rectus muscle consistent with intramuscular hematoma    - possible related to hernia, defer to surgery    #Malrotated kidney    - CT a/p shows left sided pelvic kidney with is malrotated    - suggest PCP follow up         DVT prophylaxis: SCD    CODE STATUS:       Admission Status:  I believe this patient meets obs status.    I discussed the patient's findings and my recommendations with patient.    This patient has been examined wearing appropriate Personal Protective Equipment and discussed with Patient. 06/09/23      Signature: Electronically signed by Mahsa Lyon DO, 06/09/23, 8:25 PM EDT.

## 2023-06-09 NOTE — OP NOTE
Operative Report:    Patient Name:  Hadley Astudillo  YOB: 1945    Date of Surgery:  6/9/2023     Indications:    77-year-old gentleman with history of poorly controlled hypertension, no previous abdominal surgical history who presents with 1 week of worsening diffuse abdominal pain and tightness.  He denies nausea or vomiting.  He noticed a bulge in his left groin that he does not think was there before.  CT abdomen and pelvis with incarcerated omental fat and a large left inguinal hernia.  Platelet cell count 20.  Otherwise hemodynamically normal.  Suspect he has an incarcerated hernia with possible necrotic omental fat.  Plan for attempted robotic left inguinal hernia repair with permanent mesh placement, if large amount of necrotic fat may need biologic mesh.  If unable to repair this robotically will need to convert to open inguinal hernia repair, discussed all this with patient including risk benefits and alternatives and he elected to proceed.  I discussed risk benefits and alternatives to robotic inguinal hernia repair, including bowel injury, mesh infection requiring mesh excision, recurrence, bleeding, injury to testicular vessels causing pain and possible need for orchiectomy, injury to vas deferens causing decrease in fertility rate, and chronic inguinal pain resulting from nerve injury and patient elected to proceed.     Pre-op Diagnosis:   Incarcerated left inguinal hernia       Post-Op Diagnosis Codes:  Same    Procedure/CPT® Codes:      Procedure(s):  Left INGUINAL HERNIA REPAIR LAPAROSCOPIC WITH DAVINCI ROBOT, incarcerated initial repair    Staff:  Surgeon(s):  Bowen Willingham MD    Circulator: Aris Davis RN  Scrub Person: Gladys Goldsmith RN; Kiesha Han  Assistant: Dexter Parsons CSFA  was responsible for performing the following activities: Retraction, Suction, Irrigation, Suturing, Closing, Placing Dressing and Held/Positioned Camera and their skilled  assistance was necessary for the success of this case.        Anesthesia: General    Estimated Blood Loss: 100ml    Implants:    Implant Name Type Inv. Item Serial No.  Lot No. LRB No. Used Action   DEV CLS WND VLOC/PBT SPENCER NONABS 1/2CIR SZ3/0 26MM 23CM GRN - [id number] Implant DEV CLS WND VLOC/PBT SPENCER NONABS 1/2CIR SZ3/0 26MM 23CM GRN  COVIDIEN [id number] N/A 2 Implanted   MESH PROGRIP LAP S/FIXATING [id number] - [id number] Implant MESH PROGRIP LAP S/FIXATING [id number]  COVID[employer]EN [id number] N/A 1 Implanted       Specimen:          None        Findings: Large left-sided indirect inguinal hernia containing incarcerated omental fat    Complications: None    Description of Procedure:   After risk benefits and alternatives were discussed with patient informed sent was obtained.  He was transported to the operating room placed supine on the operating table underwent general anesthesia.  His abdomen is prepped and draped in sterile fashion and a surgical timeout is completed.  I inserted a 5 mm laparoscopic trocar under laparoscopic visualization into the left upper quadrant.  Insufflated the peritoneum and inspected the area below my entry site with no injury identified.  I placed an 8 mm trocar in the right upper quadrant 8 mm trocar to the right rectus muscle and upsized my entry trocar to an 8 mm robotic trocar.  Patient was placed in Trendelenburg position.  I turned attention to the pelvis.  Patient had incarcerated omentum inside of his left inguinal hernia defect this was able to be reduced with gentle retraction and pressure from outside his scrotum.  All hernia contents appeared viable.  He had no right-sided inguinal hernia.    I made a preperitoneal flap 6 cm above the inguinal hernia defect from just left of the midline to the level of the ASIS.  I carried this flap down to the pubic tubercle and then carried the flap laterally taking care to stay superficial laterally to avoid nerve injury.  Once there  was enough space laterally to place the tail of my mesh I proceeded with reduction of the hernia sac.  Using traction as well as blunt dissection electrocautery was able to completely reduce the large indirect inguinal hernia sac.  I took care to preserve the vas and testicular artery.  I placed a 10 cm x 15 cm piece of ProGrip mesh over the inguinal hernia defect.  I used a suction  to irrigate the left lower quadrant and suctioned this clean there was no bleeding identified.  I reapproximated the peritoneal flap with running 3 oh V-Loc absorbable suture.  There was one hole in the peritoneal flap which I closed with running 3-0 Vicryl suture.  I also ligated the base of the hernia sac with 3-0 Vicryl suture.  All needles were removed.  The sponge needle and instrument counts reported to me as correct x2.  The trocars were removed after the abdomen was desufflated skin was reapproximated with 4-0 Vicryl suture and covered surgical glue.  Patient was transported to the recovery unit after he was awoken from general anesthesia without incident.      Bowen Willingham MD     Date: 6/9/2023  Time: 17:40 EDT    This note was created using Dragon Voice Recognition software.

## 2023-06-09 NOTE — ED PROVIDER NOTES
"Subjective   History of Present Illness  77-year-old male presents with 3-week history of abdominal \"fullness\" with abdominal distention and left testicular pain that started yesterday and has progressively gotten worse.  He denies any abdominal pain.  He denies history of diabetes.  He denies nausea vomiting diarrhea melena hematochezia.  Denies urinary symptoms.  Denies fever sweats chills.  He reports he is otherwise healthy, has no medical history and takes no medications.    Primary care: Josefina Reese NP    Review of Systems    Past Medical History:   Diagnosis Date    Abnormal ECG     Essential hypertension 5/7/2021    Hearing loss     Hypertension        No Known Allergies    Past Surgical History:   Procedure Laterality Date    TONSILLECTOMY         Family History   Problem Relation Age of Onset    Hearing loss Father     Heart disease Father     No Known Problems Sister        Social History     Socioeconomic History    Marital status:    Tobacco Use    Smoking status: Never    Smokeless tobacco: Never   Vaping Use    Vaping Use: Never used   Substance and Sexual Activity    Alcohol use: Yes     Comment: rare    Drug use: Never    Sexual activity: Defer           Objective   Physical Exam  Vitals and nursing note reviewed. Exam conducted with a chaperone present (MARIZOL Sam present at bedside for testicular exam).   Constitutional:       General: He is awake. He is not in acute distress.     Appearance: Normal appearance. He is well-developed and normal weight. He is not ill-appearing, toxic-appearing or diaphoretic.   HENT:      Mouth/Throat:      Mouth: Mucous membranes are moist.      Pharynx: Oropharynx is clear.   Eyes:      General: No scleral icterus.  Cardiovascular:      Rate and Rhythm: Normal rate and regular rhythm.      Pulses: Normal pulses.      Heart sounds: Normal heart sounds, S1 normal and S2 normal. Heart sounds not distant. No murmur heard.    No friction rub. No gallop. "   Pulmonary:      Effort: Pulmonary effort is normal.      Breath sounds: Normal breath sounds and air entry.   Abdominal:      General: Abdomen is protuberant. Bowel sounds are decreased. There is distension.      Palpations: Abdomen is soft. There is no mass.      Tenderness: There is no abdominal tenderness. There is no right CVA tenderness, left CVA tenderness, guarding or rebound.      Hernia: A hernia is present. Hernia is present in the left inguinal area.       Genitourinary:     Pubic Area: Rash (Erythematous, pustular rash noted in the left groin) present.      Penis: Normal.       Testes:         Right: Mass, tenderness or swelling not present. Cremasteric reflex is present.          Left: Mass, tenderness and swelling present. Cremasteric reflex is absent.    Skin:     General: Skin is warm and dry.      Capillary Refill: Capillary refill takes less than 2 seconds.      Coloration: Skin is not jaundiced or pale.      Findings: No rash.   Neurological:      Mental Status: He is alert and oriented to person, place, and time.      GCS: GCS eye subscore is 4. GCS verbal subscore is 5. GCS motor subscore is 6.   Psychiatric:         Mood and Affect: Mood normal.         Behavior: Behavior normal. Behavior is cooperative.         Thought Content: Thought content normal.         Judgment: Judgment normal.       Procedures           ED Course  ED Course as of 06/09/23 1405   Fri Jun 09, 2023 1317 Awaiting callback from general surgery. [AL]      ED Course User Index  [AL] Jackie Ring, APRN                                           Medical Decision Making  Problems Addressed:  Abdominal wall cellulitis: complicated acute illness or injury  Left inguinal hernia: complicated acute illness or injury  Leukocytosis, unspecified type: complicated acute illness or injury  Scrotal pain: complicated acute illness or injury    Amount and/or Complexity of Data Reviewed  Labs: ordered. Decision-making details documented  "in ED Course.  Radiology: ordered. Decision-making details documented in ED Course.  Discussion of management or test interpretation with external provider(s): Spoke with Dr. Willingham, surgeon on-call.  He is going to come evaluate patient at bedside and plans to do hernia repair.    Risk  Prescription drug management.  Decision regarding hospitalization.    Interpreted by radiologist as below:     CT Abdomen Pelvis With Contrast    Result Date: 6/9/2023  Impression: 1. Large fat-containing left inguinal hernia extending to the scrotal sac. There is strandy density within the herniated fat which can be seen with ischemia/inflammation. There is a left hydrocele. 2. There is a small fat density right inguinal hernia. 3. Enlarged prostate gland presumably secondary to BPH. 4. Mild colonic diverticulosis without evidence of acute diverticulitis. 5. 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 6. Partially imaged fat density mass in the left rectus muscle consistent with intramuscular hematoma. 7. Additional findings as noted above. Electronically Signed: Brannon Freeman  6/9/2023 1:02 PM EDT  Workstation ID: [id number]       BP (!) 160/105   Pulse 54   Temp 97.5 °F (36.4 °C) (Oral)   Resp 18   Ht 177.8 cm (70\")   Wt 84 kg (185 lb 3 oz)   SpO2 97%   BMI 26.57 kg/m²      Lab Results (last 24 hours)       Procedure Component Value Units Date/Time    KOH Prep - Swab, Groin [[id number]] Collected: 06/09/23 1105    Specimen: Swab from Groin Updated: 06/09/23 1127     KOH Prep No yeast or hyphal elements seen    CBC & Differential [[id number]]  (Abnormal) Collected: 06/09/23 1117    Specimen: Blood Updated: 06/09/23 1200    Narrative:      The following orders were created for panel order CBC & Differential.  Procedure                               Abnormality         Status                     ---------                               -----------         ------                     CBC Auto Differential[[id number]]        Abnormal   "          Final result               Scan Slide[[id number]]                                       Final result                 Please view results for these tests on the individual orders.    Comprehensive Metabolic Panel [[id number]]  (Abnormal) Collected: 06/09/23 1117    Specimen: Blood Updated: 06/09/23 1141     Glucose 95 mg/dL      BUN 15 mg/dL      Creatinine 0.75 mg/dL      Sodium 140 mmol/L      Potassium 4.3 mmol/L      Comment: Slight hemolysis detected by analyzer. Results may be affected.        Chloride 105 mmol/L      CO2 25.0 mmol/L      Calcium 9.3 mg/dL      Total Protein 7.0 g/dL      Albumin 4.4 g/dL      ALT (SGPT) 20 U/L      AST (SGOT) 23 U/L      Alkaline Phosphatase 63 U/L      Total Bilirubin 0.6 mg/dL      Globulin 2.6 gm/dL      A/G Ratio 1.7 g/dL      BUN/Creatinine Ratio 20.0     Anion Gap 10.0 mmol/L      eGFR 92.9 mL/min/1.73     Narrative:      GFR Normal >60  Chronic Kidney Disease <60  Kidney Failure <15    The GFR formula is only valid for adults with stable renal function between ages 18 and 70.    Lipase [[id number]]  (Normal) Collected: 06/09/23 1117    Specimen: Blood Updated: 06/09/23 1141     Lipase 24 U/L     CBC Auto Differential [[id number]]  (Abnormal) Collected: 06/09/23 1117    Specimen: Blood Updated: 06/09/23 1200     WBC 20.20 10*3/mm3      RBC 5.18 10*6/mm3      Hemoglobin 15.7 g/dL      Hematocrit 47.0 %      MCV 90.7 fL      MCH 30.2 pg      MCHC 33.4 g/dL      RDW 13.9 %      RDW-SD 45.9 fl      MPV 7.8 fL      Platelets 277 10*3/mm3     Narrative:      The previously reported component NRBC is no longer being reported. Previous result was 0.5 /100 WBC (Reference Range: 0.0-0.2 /100 WBC) on 6/9/2023 at 1130 EDT.    Scan Slide [[id number]] Collected: 06/09/23 1117    Specimen: Blood Updated: 06/09/23 1200     Scan Slide --     Comment: See Manual Differential Results       Manual Differential [[id number]]  (Abnormal) Collected: 06/09/23 1117    Specimen: Blood Updated:  06/09/23 1200     Neutrophil % 19.0 %      Lymphocyte % 68.0 %      Monocyte % 3.0 %      Eosinophil % 2.0 %      Bands %  2.0 %      Atypical Lymphocyte % 2.0 %      Prolymphocyte % 4.0 %      Neutrophils Absolute 4.24 10*3/mm3      Lymphocytes Absolute 14.14 10*3/mm3      Monocytes Absolute 0.61 10*3/mm3      Eosinophils Absolute 0.40 10*3/mm3      RBC Morphology Normal     Smudge Cells Slight/1+     Platelet Morphology Normal    Path Consult Reflex [[id number]] Collected: 06/09/23 1117    Specimen: Blood Updated: 06/09/23 1200     Pathology Review Yes    Pathology Consultation [[id number]] Collected: 06/09/23 1117    Specimen: Blood, Venous Line Updated: 06/09/23 1202    POC Glucose Once [[id number]]  (Normal) Collected: 06/09/23 1137    Specimen: Blood Updated: 06/09/23 1139     Glucose 92 mg/dL      Comment: Serial Number: [id number]:  [id number]       Urinalysis With Microscopic If Indicated (No Culture) - Urine, Clean Catch [[id number]]  (Normal) Collected: 06/09/23 1315    Specimen: Urine, Clean Catch Updated: 06/09/23 1327     Color, UA Yellow     Appearance, UA Clear     pH, UA 8.0     Specific Gravity, UA 1.022     Glucose, UA Negative     Ketones, UA Negative     Bilirubin, UA Negative     Blood, UA Negative     Protein, UA Negative     Leuk Esterase, UA Negative     Nitrite, UA Negative     Urobilinogen, UA 0.2 E.U./dL    Narrative:      Urine microscopic not indicated.             Medications   sodium chloride 0.9 % flush 10 mL (has no administration in time range)   piperacillin-tazobactam (ZOSYN) IVPB 3.375 g in 100 mL NS (CD) (has no administration in time range)   HYDROmorphone (DILAUDID) injection 1 mg (has no administration in time range)   lactated ringers bolus 1,000 mL (1,000 mL Intravenous New Bag 6/9/23 1145)   HYDROmorphone (DILAUDID) injection 0.5 mg (0.5 mg Intravenous Given 6/9/23 1145)   ondansetron (ZOFRAN) injection 4 mg (4 mg Intravenous Given 6/9/23 1145)   iopamidol (ISOVUE-370)  76 % injection 100 mL (100 mL Intravenous Given 6/9/23 1244)        Patient undressed and placed in gown for exam.  Appropriate PPE worn during patient exam.  Appropriate monitoring initiated. Patient is alert and oriented x3.  No acute distress noted. Patient is noted to have erythematous, distended abdomen.  There is a left inguinal hernia noted that extends to the left testicle.  There is overlying erythema.  Bowel sounds are decreased.  IV established labs obtained.  Abdominal protocol initiated.  Patient was given lactated Ringer's 1 L bolus, Dilaudid 0.5 mg IV, and Zofran 4 mg IV. White blood cell count 20,200 platelets 277 hemoglobin 15.7 hematocrit 47.0 CMP unremarkable.  Lipase 24 urinalysis unremarkable.  KOH swab shows no yeast.  CT of the abdomen pelvis was significant for a large fat-containing left inguinal hernia that extends to the scrotal sac.  There is also herniated fat with ischemia versus inflammation and a left hydrocele.  Spoke with surgeon on-call who is going to come evaluate patient at bedside.  Patient was started on Unasyn for abdominal wall cellulitis and presurgical coverage.  Spoke with [facility] [place] who agrees patient should be admitted for surgical consultation.    I reviewed chart 2/10/2022 patient was seen by audiology for hearing loss. My radiology interpretation CT shows left inguinal hernia that extends to the scrotal sac. Differential Diagnoses, not all-inclusive and does not constitute entirety of all causes: Bowel obstruction, strangulated hernia, testicular torsion.  Patient was found to have inguinal hernia that extends to the scrotal sac with herniated fat versus ischemia.  Bowel obstruction ruled out by imaging.  Patient's pain is not consistent with testicular torsion.    Disposition/Treatment: Discussed results with patient, verbalized understanding. Agreeable with plan of care. Patient was stable upon admission.    Upon reassessment, patient is flesh tone warm and dry no  acute distress noted.  Vital signs are stable.    Part of this note may be an electronic transcription/translation of spoken language to printed text using the Dragon Dictation System.         Final diagnoses:   Left inguinal hernia   Abdominal wall cellulitis   Leukocytosis, unspecified type   Scrotal pain       ED Disposition  ED Disposition       ED Disposition   Decision to Admit    Condition   --    Comment   Level of Care: Med/Surg [1]   Admitting Physician: KIRSTIN RAYA [[id number]]   Attending Physician: KIRSTIN RAYA [[id number]]                 No follow-up provider specified.       Medication List      No changes were made to your prescriptions during this visit.            Jackie Ring, APRN  06/09/23 [id number]

## 2023-06-09 NOTE — ANESTHESIA PROCEDURE NOTES
Airway  Urgency: elective    Date/Time: 6/9/2023 3:10 PM  End Time:6/9/2023 3:11 PM  Airway not difficult    General Information and Staff    Patient location during procedure: OR  Anesthesiologist: Cristina Bro MD  CRNA/CAA: Gavino Juarez CAA    Indications and Patient Condition  Indications for airway management: airway protection    Preoxygenated: yes  MILS maintained throughout  Mask difficulty assessment: 1 - vent by mask    Final Airway Details  Final airway type: endotracheal airway      Successful airway: ETT  Cuffed: yes   Successful intubation technique: direct laryngoscopy  Facilitating devices/methods: intubating stylet and anterior pressure/BURP  Endotracheal tube insertion site: oral  Blade: Nan  Blade size: 3  ETT size (mm): 7.5  Cormack-Lehane Classification: grade IIa - partial view of glottis  Placement verified by: chest auscultation and capnometry   Measured from: teeth  ETT/EBT  to teeth (cm): 21  Number of attempts at approach: 1  Assessment: lips, teeth, and gum same as pre-op and atraumatic intubation

## 2023-06-09 NOTE — ANESTHESIA POSTPROCEDURE EVALUATION
Patient: Hadley Astudillo    Procedure Summary     Date: 06/09/23 Room / Location: [facility] OR 08 / [facility] MAIN OR    Anesthesia Start: 1501 Anesthesia Stop: 1703    Procedure: INGUINAL HERNIA REPAIR LAPAROSCOPIC WITH DAVINCI ROBOT (Left: Abdomen) Diagnosis:     Surgeons: Bowen Willingham MD Provider: Alin Clifford MD    Anesthesia Type: general ASA Status: 3          Anesthesia Type: general    Vitals  Vitals Value Taken Time   /78 06/09/23 1922   Temp 97.7 °F (36.5 °C) 06/09/23 1900   Pulse 54 06/09/23 1924   Resp 12 06/09/23 1915   SpO2 94 % 06/09/23 1924   Vitals shown include unvalidated device data.        Post Anesthesia Care and Evaluation    Patient location during evaluation: PACU  Patient participation: complete - patient participated  Level of consciousness: awake  Pain score: 3 (See nurse's notes for pain score)  Pain management: adequate    Airway patency: patent  Anesthetic complications: No anesthetic complications  PONV Status: none  Cardiovascular status: acceptable  Respiratory status: acceptable and spontaneous ventilation  Hydration status: acceptable    Comments: Patient seen and examined postoperatively; vital signs stable; SpO2 greater than or equal to 90%; cardiopulmonary status stable; nausea/vomiting adequately controlled; pain adequately controlled; no apparent anesthesia complications; patient discharged from anesthesia care when discharge criteria were met

## 2023-06-10 PROBLEM — N28.83: Chronic | Status: ACTIVE | Noted: 2023-06-10

## 2023-06-10 PROBLEM — K40.30 INCARCERATED LEFT INGUINAL HERNIA: Status: RESOLVED | Noted: 2023-06-10 | Resolved: 2023-06-10

## 2023-06-10 PROBLEM — K40.30 INCARCERATED LEFT INGUINAL HERNIA: Status: ACTIVE | Noted: 2023-06-10

## 2023-06-10 PROBLEM — Z98.890 HISTORY OF ROBOT-ASSISTED REPAIR OF LEFT INGUINAL HERNIA: Chronic | Status: ACTIVE | Noted: 2023-06-10

## 2023-06-10 PROBLEM — I16.0 HYPERTENSIVE URGENCY: Status: ACTIVE | Noted: 2023-06-10

## 2023-06-10 PROBLEM — D72.829 LEUKOCYTOSIS: Status: ACTIVE | Noted: 2023-06-10

## 2023-06-10 PROBLEM — Z87.19 HISTORY OF ROBOT-ASSISTED REPAIR OF LEFT INGUINAL HERNIA: Chronic | Status: ACTIVE | Noted: 2023-06-10

## 2023-06-10 PROBLEM — N40.0 PROSTATE ENLARGEMENT: Chronic | Status: ACTIVE | Noted: 2023-06-10

## 2023-06-10 LAB
ANION GAP SERPL CALCULATED.3IONS-SCNC: 9 MMOL/L (ref 5–15)
BUN SERPL-MCNC: 15 MG/DL (ref 8–23)
BUN/CREAT SERPL: 16.7 (ref 7–25)
CALCIUM SPEC-SCNC: 8.6 MG/DL (ref 8.6–10.5)
CHLORIDE SERPL-SCNC: 105 MMOL/L (ref 98–107)
CO2 SERPL-SCNC: 26 MMOL/L (ref 22–29)
CREAT SERPL-MCNC: 0.9 MG/DL (ref 0.76–1.27)
DEPRECATED RDW RBC AUTO: 44.2 FL (ref 37–54)
DEPRECATED RDW RBC AUTO: 44.2 FL (ref 37–54)
EGFRCR SERPLBLD CKD-EPI 2021: 88 ML/MIN/1.73
ERYTHROCYTE [DISTWIDTH] IN BLOOD BY AUTOMATED COUNT: 13.8 % (ref 12.3–15.4)
ERYTHROCYTE [DISTWIDTH] IN BLOOD BY AUTOMATED COUNT: 13.8 % (ref 12.3–15.4)
GLUCOSE SERPL-MCNC: 130 MG/DL (ref 65–99)
HCT VFR BLD AUTO: 43.5 % (ref 37.5–51)
HCT VFR BLD AUTO: 45.6 % (ref 37.5–51)
HGB BLD-MCNC: 14.1 G/DL (ref 13–17.7)
HGB BLD-MCNC: 14.8 G/DL (ref 13–17.7)
MCH RBC QN AUTO: 30 PG (ref 26.6–33)
MCH RBC QN AUTO: 30.1 PG (ref 26.6–33)
MCHC RBC AUTO-ENTMCNC: 32.4 G/DL (ref 31.5–35.7)
MCHC RBC AUTO-ENTMCNC: 32.5 G/DL (ref 31.5–35.7)
MCV RBC AUTO: 92.5 FL (ref 79–97)
MCV RBC AUTO: 92.7 FL (ref 79–97)
PLATELET # BLD AUTO: 267 10*3/MM3 (ref 140–450)
PLATELET # BLD AUTO: 298 10*3/MM3 (ref 140–450)
PMV BLD AUTO: 8 FL (ref 6–12)
PMV BLD AUTO: 8.1 FL (ref 6–12)
POTASSIUM SERPL-SCNC: 4.4 MMOL/L (ref 3.5–5.2)
RBC # BLD AUTO: 4.7 10*6/MM3 (ref 4.14–5.8)
RBC # BLD AUTO: 4.92 10*6/MM3 (ref 4.14–5.8)
SODIUM SERPL-SCNC: 140 MMOL/L (ref 136–145)
WBC NRBC COR # BLD: 19.3 10*3/MM3 (ref 3.4–10.8)
WBC NRBC COR # BLD: 21.9 10*3/MM3 (ref 3.4–10.8)

## 2023-06-10 PROCEDURE — G0103 PSA SCREENING: HCPCS | Performed by: FAMILY MEDICINE

## 2023-06-10 PROCEDURE — 85027 COMPLETE CBC AUTOMATED: CPT | Performed by: STUDENT IN AN ORGANIZED HEALTH CARE EDUCATION/TRAINING PROGRAM

## 2023-06-10 PROCEDURE — 63710000001 AMLODIPINE 5 MG TABLET: Performed by: FAMILY MEDICINE

## 2023-06-10 PROCEDURE — 84443 ASSAY THYROID STIM HORMONE: CPT | Performed by: FAMILY MEDICINE

## 2023-06-10 PROCEDURE — 80048 BASIC METABOLIC PNL TOTAL CA: CPT | Performed by: STUDENT IN AN ORGANIZED HEALTH CARE EDUCATION/TRAINING PROGRAM

## 2023-06-10 PROCEDURE — A9270 NON-COVERED ITEM OR SERVICE: HCPCS | Performed by: FAMILY MEDICINE

## 2023-06-10 PROCEDURE — 63710000001 CLONIDINE 0.1 MG/24HR PATCH WEEKLY: Performed by: FAMILY MEDICINE

## 2023-06-10 PROCEDURE — 63710000001 LISINOPRIL 20 MG TABLET: Performed by: FAMILY MEDICINE

## 2023-06-10 PROCEDURE — A9270 NON-COVERED ITEM OR SERVICE: HCPCS | Performed by: STUDENT IN AN ORGANIZED HEALTH CARE EDUCATION/TRAINING PROGRAM

## 2023-06-10 PROCEDURE — 63710000001 OXYCODONE 5 MG TABLET: Performed by: STUDENT IN AN ORGANIZED HEALTH CARE EDUCATION/TRAINING PROGRAM

## 2023-06-10 PROCEDURE — 99024 POSTOP FOLLOW-UP VISIT: CPT | Performed by: STUDENT IN AN ORGANIZED HEALTH CARE EDUCATION/TRAINING PROGRAM

## 2023-06-10 PROCEDURE — 25010000002 HYDRALAZINE PER 20 MG: Performed by: FAMILY MEDICINE

## 2023-06-10 PROCEDURE — G0378 HOSPITAL OBSERVATION PER HR: HCPCS

## 2023-06-10 PROCEDURE — 63710000001 POLYETHYLENE GLYCOL 17 G PACK: Performed by: STUDENT IN AN ORGANIZED HEALTH CARE EDUCATION/TRAINING PROGRAM

## 2023-06-10 PROCEDURE — 25010000002 PIPERACILLIN SOD-TAZOBACTAM PER 1 G: Performed by: STUDENT IN AN ORGANIZED HEALTH CARE EDUCATION/TRAINING PROGRAM

## 2023-06-10 PROCEDURE — 84439 ASSAY OF FREE THYROXINE: CPT | Performed by: FAMILY MEDICINE

## 2023-06-10 RX ORDER — CLONIDINE 0.1 MG/24H
1 PATCH, EXTENDED RELEASE TRANSDERMAL WEEKLY
Status: DISCONTINUED | OUTPATIENT
Start: 2023-06-10 | End: 2023-06-11 | Stop reason: HOSPADM

## 2023-06-10 RX ORDER — LISINOPRIL 20 MG/1
40 TABLET ORAL
Status: DISCONTINUED | OUTPATIENT
Start: 2023-06-10 | End: 2023-06-11 | Stop reason: HOSPADM

## 2023-06-10 RX ORDER — AMLODIPINE BESYLATE 5 MG/1
5 TABLET ORAL 2 TIMES DAILY
Status: DISCONTINUED | OUTPATIENT
Start: 2023-06-10 | End: 2023-06-11 | Stop reason: HOSPADM

## 2023-06-10 RX ORDER — HYDRALAZINE HYDROCHLORIDE 20 MG/ML
10 INJECTION INTRAMUSCULAR; INTRAVENOUS EVERY 6 HOURS PRN
Status: DISCONTINUED | OUTPATIENT
Start: 2023-06-10 | End: 2023-06-11 | Stop reason: HOSPADM

## 2023-06-10 RX ADMIN — Medication 10 ML: at 08:20

## 2023-06-10 RX ADMIN — Medication 10 MG: at 19:14

## 2023-06-10 RX ADMIN — CLONIDINE 1 PATCH: 0.1 PATCH TRANSDERMAL at 22:24

## 2023-06-10 RX ADMIN — Medication 10 ML: at 22:25

## 2023-06-10 RX ADMIN — PIPERACILLIN AND TAZOBACTAM 3.38 G: 3; .375 INJECTION, POWDER, LYOPHILIZED, FOR SOLUTION INTRAVENOUS at 03:15

## 2023-06-10 RX ADMIN — HYDRALAZINE HYDROCHLORIDE 10 MG: 20 INJECTION INTRAMUSCULAR; INTRAVENOUS at 15:44

## 2023-06-10 RX ADMIN — POLYETHYLENE GLYCOL 3350 17 G: 17 POWDER, FOR SOLUTION ORAL at 08:20

## 2023-06-10 RX ADMIN — LISINOPRIL 40 MG: 20 TABLET ORAL at 16:58

## 2023-06-10 RX ADMIN — AMLODIPINE BESYLATE 5 MG: 5 TABLET ORAL at 16:58

## 2023-06-10 RX ADMIN — PIPERACILLIN AND TAZOBACTAM 3.38 G: 3; .375 INJECTION, POWDER, LYOPHILIZED, FOR SOLUTION INTRAVENOUS at 11:25

## 2023-06-10 RX ADMIN — OXYCODONE HYDROCHLORIDE 5 MG: 5 TABLET ORAL at 18:59

## 2023-06-10 RX ADMIN — PIPERACILLIN AND TAZOBACTAM 3.38 G: 3; .375 INJECTION, POWDER, LYOPHILIZED, FOR SOLUTION INTRAVENOUS at 19:37

## 2023-06-10 NOTE — PROGRESS NOTES
General Surgery Progress Note    Name: Hadley Astudillo ADMIT: 2023   : 1945  PCP: Mary Reese APRN    MRN: [id number] LOS: 0 days   AGE/SEX: 77 y.o. male  ROOM: [address] [place] [address]    Chief Complaint   Patient presents with   • Groin Swelling     Swelling to left testicle lower abd pain started 2 weeks ago with bulge in abd and now down to testicles.     Subjective     Postop day 1 from incarcerated left inguinal hernia repair.  Feeling great, pain well controlled on oral pain medication no nausea or vomiting.    Objective     Scheduled Medications:   piperacillin-tazobactam, 3.375 g, Intravenous, Q8H  polyethylene glycol, 17 g, Oral, Daily  sodium chloride, 10 mL, Intravenous, Q12H        Active Infusions:  lactated ringers, 1,000 mL, Last Rate: 1,000 mL (23 1744)  Pharmacy to Dose Zosyn,         As Needed Medications:  •  acetaminophen  •  labetalol  •  nitroglycerin  •  ondansetron  •  ondansetron **OR** ondansetron  •  oxyCODONE  •  Pharmacy to Dose Zosyn  •  sodium chloride  •  sodium chloride  •  sodium chloride    Vital Signs  Vital Signs Patient Vitals for the past 24 hrs:   BP Temp Temp src Pulse Resp SpO2 Weight   06/10/23 1100 168/85 98.6 °F (37 °C) Oral -- 17 -- --   06/10/23 0800 164/83 98.1 °F (36.7 °C) Oral 69 12 99 % --   06/10/23 0524 -- -- -- -- -- -- 84.3 kg (185 lb 13.6 oz)   06/10/23 0400 151/84 97.7 °F (36.5 °C) Oral 65 15 98 % --   06/10/23 0057 149/98 98.5 °F (36.9 °C) Oral 58 19 98 % --   23 -- -- -- -- -- -- 84 kg (185 lb 3 oz)   23 175/83 97 °F (36.1 °C) Axillary 79 13 92 % --   23 1920 127/78 -- -- 58 -- 95 % --   23 139/91 -- -- 67 12 97 % --   23 121/72 -- -- 54 -- 97 % --   23 129/71 -- -- 52 -- 97 % --   23 132/69 97.7 °F (36.5 °C) Oral 51 -- 97 % --   23 109/66 97.7 °F (36.5 °C) Oral 54 13 94 % --   23 102/66 -- -- 54 12 94 % --   23 111/57 -- -- 58 10 94 %  --   06/09/23 1830 116/63 -- -- 57 -- 94 % --   06/09/23 1825 110/60 -- -- 55 11 94 % --   06/09/23 1820 106/54 -- -- 58 12 94 % --   06/09/23 1815 108/58 -- -- 58 -- 93 % --   06/09/23 1810 103/57 -- -- 55 12 93 % --   06/09/23 1805 117/55 -- -- 58 -- 93 % --   06/09/23 1800 107/58 -- -- 57 11 93 % --   06/09/23 1755 109/60 -- -- 57 12 93 % --   06/09/23 1750 117/66 -- -- 57 14 92 % --   06/09/23 1745 113/63 -- -- 59 12 92 % --   06/09/23 1740 119/65 -- -- 62 11 92 % --   06/09/23 1735 118/70 -- -- 62 12 92 % --   06/09/23 1725 (!) 140/102 -- -- 98 -- 93 % --   06/09/23 1720 141/89 -- -- 79 13 90 % --   06/09/23 1715 -- -- -- 91 -- 93 % --   06/09/23 1705 (!) 155/114 97.4 °F (36.3 °C) -- 100 16 92 % --   06/09/23 1442 (!) 168/112 98.1 °F (36.7 °C) -- 59 12 92 % --     I/O:  I/O last 3 completed shifts:  In: 540 [P.O.:240; I.V.:200; IV Piggyback:100]  Out: 300 [Urine:300]    Physical Exam:  Physical Exam  Constitutional:       General: He is not in acute distress.     Appearance: Normal appearance. He is not ill-appearing.   HENT:      Head: Normocephalic and atraumatic.      Right Ear: External ear normal.      Left Ear: External ear normal.   Eyes:      Extraocular Movements: Extraocular movements intact.      Conjunctiva/sclera: Conjunctivae normal.   Cardiovascular:      Rate and Rhythm: Normal rate and regular rhythm.   Pulmonary:      Effort: Pulmonary effort is normal. No respiratory distress.   Abdominal:      General: There is no distension.      Palpations: Abdomen is soft.      Tenderness: There is no abdominal tenderness.   Musculoskeletal:         General: No swelling or deformity.   Skin:     General: Skin is warm and dry.   Neurological:      Mental Status: He is alert and oriented to person, place, and time. Mental status is at baseline.         Results Review:     CBC    Results from last 7 days   Lab Units 06/10/23  0032 06/09/23  1117   WBC 10*3/mm3 19.30* 20.20*   HEMOGLOBIN g/dL 14.1 15.7    PLATELETS 10*3/mm3 267 277     BMP   Results from last 7 days   Lab Units 06/10/23  0032 06/09/23  1117   SODIUM mmol/L 140 140   POTASSIUM mmol/L 4.4 4.3   CHLORIDE mmol/L 105 105   CO2 mmol/L 26.0 25.0   BUN mg/dL 15 15   CREATININE mg/dL 0.90 0.75*   GLUCOSE mg/dL 130* 95     Radiology(recent) CT Abdomen Pelvis With Contrast    Result Date: 6/9/2023  Impression: 1. Large fat-containing left inguinal hernia extending to the scrotal sac. There is strandy density within the herniated fat which can be seen with ischemia/inflammation. There is a left hydrocele. 2. There is a small fat density right inguinal hernia. 3. Enlarged prostate gland presumably secondary to BPH. 4. Mild colonic diverticulosis without evidence of acute diverticulitis. 5. 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 6. Partially imaged fat density mass in the left rectus muscle consistent with intramuscular hematoma. 7. Additional findings as noted above. Electronically Signed: Brannon Freeman  6/9/2023 1:02 PM EDT  Workstation ID: [id number]      I reviewed the patient's new clinical results.    Assessment & Plan       Left inguinal hernia      77 y.o. male postop day 1 from incarcerated left inguinal hernia repair.  Okay for discharge from my standpoint.  Can follow-up in the office in 2 weeks for wound check.  No lifting over 10 pounds for 6 weeks.        This note was created using Dragon Voice Recognition software.    Bowen Willingham MD  06/10/23  14:36 EDT

## 2023-06-10 NOTE — PROGRESS NOTES
[facility] Medicine Services   Daily Progress Note    Patient Name: Hadley Astudillo  : 1945  MRN: [id number]  Primary Care Physician:  Mary Reese APRN  Date of admission: 2023    Subjective      Admitted in consultation with surgery for incarcerated left inguinal hernia    Doing okay postoperatively.  Da Keith assisted laparoscopic left inguinal hernia repair was completed without complications.  He is on 2 L nasal cannula and tells me that he wears oxygen at night.  Blood pressures been elevated.  He tells me that it runs pretty high in the outpatient setting but he is not on any medication currently. Reviewed his last echocardiogram from 2021 (see below).  WBC count remains elevated at 19.3.  Nursing staff tells me that he has been getting up and around the room pretty well.  Physical therapy consultation is pending.  He is on clear liquids currently.  Discussed the case with the bedside nursing staff. No other acute concerns.    Objective      Vitals:   Temp:  [97 °F (36.1 °C)-98.5 °F (36.9 °C)] 98.1 °F (36.7 °C)  Heart Rate:  [] 69  Resp:  [10-19] 12  BP: (102-219)/() 164/83  Flow (L/min):  [2-10] 2    Physical Exam   GEN: WDWN, no acute distress  HEENT: NCAT, PERRLA, moist mucous membranes  NECK: Supple, midline trachea  CARD: RRR, no appreciable M/R/G, no peripheral edema  PULM: Fairly CTAB, non-distressed  ABD: soft, appropriate postoperative tenderness, trocar sites look good with no signs of infection, hypoactive bowel sounds  NEURO: Grossly intact, non-focal exam  PSYCH: Pleasant    Result Review    I have personally reviewed the results from the time of this admission to 6/10/2023 10:02 EDT and agree with these findings:  [x]  Laboratory  [x]  Microbiology  [x]  Radiology  [x]  EKG/Telemetry   []  Cardiology/Vascular   []  Pathology  [x]  Old records  []  Other:    Results for orders placed during the hospital encounter of 04/15/21    Adult Transthoracic Echo  Complete W/ Color, Spectral and Contrast if Necessary Per Protocol    Interpretation Summary  · Left ventricular wall thickness is consistent with mild concentric hypertrophy.  · Left ventricular ejection fraction appears to be 61 - 65%. Left ventricular systolic function is normal.  · The right ventricular cavity is mild to moderately dilated.      Wounds (last 24 hours)       LDA Wound       Row Name 06/10/23 0000 06/09/23 2305 06/09/23 1850       Wound 06/09/23 1530 midline abdomen Incision    Wound - Properties Group Placement Date: 06/09/23  -JR Placement Time: 1530  -JR Present on Hospital Admission: N  -JR Orientation: midline  -JR Location: abdomen  -JR Primary Wound Type: Incision  -JR    Dressing Appearance no drainage  -SM no drainage  -LS no drainage  -CL    Closure Liquid skin adhesive  -SM Liquid skin adhesive  -LS Liquid skin adhesive  -CL    Drainage Characteristics/Odor -- bleeding controlled  -LS --    Drainage Amount -- none  -LS --    Retired Wound - Properties Group Placement Date: 06/09/23  -JR Placement Time: 1530  -JR Present on Hospital Admission: N  -JR Orientation: midline  -JR Location: abdomen  -JR Primary Wound Type: Incision  -JR    Retired Wound - Properties Group Date first assessed: 06/09/23  -JR Time first assessed: 1530  -JR Present on Hospital Admission: N  -JR Location: abdomen  -JR Primary Wound Type: Incision  -JR      Row Name 06/09/23 1849 06/09/23 1805 06/09/23 1750       Wound 06/09/23 1530 midline abdomen Incision    Wound - Properties Group Placement Date: 06/09/23  -JR Placement Time: 1530  -JR Present on Hospital Admission: N  -JR Orientation: midline  -JR Location: abdomen  -JR Primary Wound Type: Incision  -JR    Dressing Appearance no drainage  -CL -- no drainage;open to air  -CL    Closure Liquid skin adhesive  -CL Liquid skin adhesive  -CL Liquid skin adhesive  -CL    Retired Wound - Properties Group Placement Date: 06/09/23  -JR Placement Time: 1530  -JR Present  on Hospital Admission: N  -JR Orientation: midline  -JR Location: abdomen  -JR Primary Wound Type: Incision  -JR    Retired Wound - Properties Group Date first assessed: 06/09/23  -JR Time first assessed: 1530  -JR Present on Hospital Admission: N  -JR Location: abdomen  -JR Primary Wound Type: Incision  -JR      Row Name 06/09/23 1735 06/09/23 1720 06/09/23 1705       Wound 06/09/23 1530 midline abdomen Incision    Wound - Properties Group Placement Date: 06/09/23  -JR Placement Time: 1530  -JR Present on Hospital Admission: N  -JR Orientation: midline  -JR Location: abdomen  -JR Primary Wound Type: Incision  -JR    Dressing Appearance no drainage;open to air  -CL open to air;no drainage  -CL no drainage;open to air  -CL    Closure Liquid skin adhesive  -CL Liquid skin adhesive  -CL Liquid skin adhesive  -CL    Retired Wound - Properties Group Placement Date: 06/09/23  - Placement Time: 1530  -JR Present on Hospital Admission: N  -JR Orientation: midline  -JR Location: abdomen  -JR Primary Wound Type: Incision  -JR    Retired Wound - Properties Group Date first assessed: 06/09/23  - Time first assessed: 1530  -JR Present on Hospital Admission: N  -JR Location: abdomen  -JR Primary Wound Type: Incision  -JR      Row Name 06/09/23 1649             Wound 06/09/23 1530 midline abdomen Incision    Wound - Properties Group Placement Date: 06/09/23  -JR Placement Time: 1530  -JR Present on Hospital Admission: N  -JR Orientation: midline  -JR Location: abdomen  -JR Primary Wound Type: Incision  -JR    Dressing Appearance dry;intact  Exofin x3  -JR      Retired Wound - Properties Group Placement Date: 06/09/23  - Placement Time: 1530  -JR Present on Hospital Admission: N  -JR Orientation: midline  -JR Location: abdomen  -JR Primary Wound Type: Incision  -JR    Retired Wound - Properties Group Date first assessed: 06/09/23  - Time first assessed: 1530  -JR Present on Hospital Admission: N  -JR Location: abdomen  -JR  Primary Wound Type: Incision  -JR              User Key  (r) = Recorded By, (t) = Taken By, (c) = Cosigned By      Initials Name Provider Type    Danette Cabrera RN Registered Nurse    Dee Soto RN Registered Nurse    Shantal Leavitt LPN Licensed Nurse    Aris Ellis RN Registered Nurse                  Assessment & Plan      piperacillin-tazobactam, 3.375 g, Intravenous, Q8H  polyethylene glycol, 17 g, Oral, Daily  sodium chloride, 10 mL, Intravenous, Q12H       lactated ringers, 1,000 mL, Last Rate: 1,000 mL (06/09/23 1744)  Pharmacy to Dose Zosyn,        Active Hospital Problems    Diagnosis  POA   • Hypertensive urgency [I16.0]  Yes   • History of robot-assisted repair of left inguinal hernia [Z98.890, Z87.19]  Not Applicable   • Leukocytosis [D72.829]  Yes   • Kidney displacement [N28.83]  Yes   • Prostate enlargement [N40.0]  Yes   • Essential hypertension [I10]  Yes      Resolved Hospital Problems    Diagnosis Date Resolved POA   • **Incarcerated left inguinal hernia [K40.30] 06/10/2023 Yes     Plan:   Routine postoperative management and advancement of diet as per general surgery.  As needed hydralazine for elevated blood pressures greater than 170 systolic.  We will start lisinopril and Norvasc.  Heart rate seems to be in the 50s and 60s at baseline, so I will avoid beta-blocker therapy for now with no clear indication.  I will also avoid diuretics in the immediate postoperative period to ensure adequate hydration while advancing his diet.  Continue antibiotics for leukocytosis.  May consider repeating his echocardiogram to further explore his hypertension  Outpatient follow-up for malrotated left kidney.  This could be contributing to his hypertension as well.  PSA added to his lab test from this morning to further evaluate prostate enlargement seen on admission imaging.  PT    DVT prophylaxis:  Mechanical DVT prophylaxis orders are present.    CODE STATUS:    Code Status  (Patient has no pulse and is not breathing): CPR (Attempt to Resuscitate)  Medical Interventions (Patient has pulse or is breathing): Full Support    Disposition:  I expect patient to be discharged home tomorrow as long as his blood pressure looks better.    Electronically signed by Sascha Stevens MD, 06/10/23, 10:02 EDT.  [facility]

## 2023-06-10 NOTE — PLAN OF CARE
Goal Outcome Evaluation:     Pt is able to make needs known. No c/o pain throughout shift. Pt is ambulatory with assist x 1.  Medication started to control blood pressure. Education is complete , call light is in reach, and no other needs at this time. Will continue to monitor.

## 2023-06-11 VITALS
OXYGEN SATURATION: 93 % | SYSTOLIC BLOOD PRESSURE: 174 MMHG | HEART RATE: 100 BPM | RESPIRATION RATE: 19 BRPM | DIASTOLIC BLOOD PRESSURE: 97 MMHG | BODY MASS INDEX: 26.61 KG/M2 | WEIGHT: 185.85 LBS | HEIGHT: 70 IN | TEMPERATURE: 97.9 F

## 2023-06-11 PROBLEM — I16.0 HYPERTENSIVE URGENCY: Status: RESOLVED | Noted: 2023-06-10 | Resolved: 2023-06-11

## 2023-06-11 LAB
ANION GAP SERPL CALCULATED.3IONS-SCNC: 12 MMOL/L (ref 5–15)
BUN SERPL-MCNC: 18 MG/DL (ref 8–23)
BUN/CREAT SERPL: 15.4 (ref 7–25)
CALCIUM SPEC-SCNC: 9 MG/DL (ref 8.6–10.5)
CHLORIDE SERPL-SCNC: 107 MMOL/L (ref 98–107)
CO2 SERPL-SCNC: 25 MMOL/L (ref 22–29)
CREAT SERPL-MCNC: 1.17 MG/DL (ref 0.76–1.27)
EGFRCR SERPLBLD CKD-EPI 2021: 64.2 ML/MIN/1.73
GLUCOSE SERPL-MCNC: 95 MG/DL (ref 65–99)
POTASSIUM SERPL-SCNC: 4 MMOL/L (ref 3.5–5.2)
PSA SERPL-MCNC: 2.71 NG/ML (ref 0–4)
SODIUM SERPL-SCNC: 144 MMOL/L (ref 136–145)
T4 FREE SERPL-MCNC: 1.84 NG/DL (ref 0.93–1.7)
TSH SERPL DL<=0.05 MIU/L-ACNC: 1.12 UIU/ML (ref 0.27–4.2)

## 2023-06-11 PROCEDURE — 63710000001 AMLODIPINE 5 MG TABLET: Performed by: FAMILY MEDICINE

## 2023-06-11 PROCEDURE — 97161 PT EVAL LOW COMPLEX 20 MIN: CPT

## 2023-06-11 PROCEDURE — 63710000001 LISINOPRIL 20 MG TABLET: Performed by: FAMILY MEDICINE

## 2023-06-11 PROCEDURE — G0378 HOSPITAL OBSERVATION PER HR: HCPCS

## 2023-06-11 PROCEDURE — 25010000002 PIPERACILLIN SOD-TAZOBACTAM PER 1 G: Performed by: STUDENT IN AN ORGANIZED HEALTH CARE EDUCATION/TRAINING PROGRAM

## 2023-06-11 PROCEDURE — A9270 NON-COVERED ITEM OR SERVICE: HCPCS | Performed by: FAMILY MEDICINE

## 2023-06-11 PROCEDURE — 83835 ASSAY OF METANEPHRINES: CPT | Performed by: FAMILY MEDICINE

## 2023-06-11 RX ORDER — AMOXICILLIN AND CLAVULANATE POTASSIUM 875; 125 MG/1; MG/1
1 TABLET, FILM COATED ORAL 2 TIMES DAILY
Qty: 10 TABLET | Refills: 0 | Status: SHIPPED | OUTPATIENT
Start: 2023-06-11

## 2023-06-11 RX ORDER — CARVEDILOL 6.25 MG/1
6.25 TABLET ORAL 2 TIMES DAILY WITH MEALS
Qty: 60 TABLET | Refills: 11 | Status: SHIPPED | OUTPATIENT
Start: 2023-06-11

## 2023-06-11 RX ORDER — AMLODIPINE BESYLATE 5 MG/1
5 TABLET ORAL 2 TIMES DAILY
Qty: 30 TABLET | Refills: 11 | Status: SHIPPED | OUTPATIENT
Start: 2023-06-11

## 2023-06-11 RX ORDER — LISINOPRIL 40 MG/1
40 TABLET ORAL
Qty: 30 TABLET | Refills: 11 | Status: SHIPPED | OUTPATIENT
Start: 2023-06-12

## 2023-06-11 RX ORDER — POLYETHYLENE GLYCOL 3350 17 G/17G
17 POWDER, FOR SOLUTION ORAL DAILY PRN
Start: 2023-06-11

## 2023-06-11 RX ORDER — OXYCODONE HYDROCHLORIDE 5 MG/1
5 TABLET ORAL EVERY 6 HOURS PRN
Qty: 12 TABLET | Refills: 0 | Status: SHIPPED | OUTPATIENT
Start: 2023-06-11

## 2023-06-11 RX ADMIN — AMLODIPINE BESYLATE 5 MG: 5 TABLET ORAL at 08:07

## 2023-06-11 RX ADMIN — PIPERACILLIN AND TAZOBACTAM 3.38 G: 3; .375 INJECTION, POWDER, LYOPHILIZED, FOR SOLUTION INTRAVENOUS at 03:10

## 2023-06-11 RX ADMIN — LISINOPRIL 40 MG: 20 TABLET ORAL at 08:07

## 2023-06-11 RX ADMIN — Medication 10 ML: at 08:07

## 2023-06-11 NOTE — THERAPY EVALUATION
Patient Name: Hadley Astudillo  : 1945    MRN: [id number]                              Today's Date: 2023       Admit Date: 2023    Visit Dx:     ICD-10-CM ICD-9-CM   1. Left inguinal hernia  K40.90 550.90   2. Abdominal wall cellulitis  L03.311 682.2   3. Leukocytosis, unspecified type  D72.829 288.60   4. Scrotal pain  N50.82 608.9   5. Incarcerated left inguinal hernia  K40.30 550.10   6. History of robot-assisted repair of left inguinal hernia  Z98.890 V45.89    Z87.19      Patient Active Problem List   Diagnosis    Abnormal EKG    History of syncope    Essential hypertension    Left inguinal hernia    History of robot-assisted repair of left inguinal hernia    Leukocytosis    Kidney displacement    Prostate enlargement     Past Medical History:   Diagnosis Date    Abnormal ECG     Essential hypertension 2021    Hearing loss     History of robot-assisted repair of left inguinal hernia 6/10/2023    Hypertension     Hypertensive urgency 6/10/2023    Incarcerated left inguinal hernia 6/10/2023    Kidney displacement 6/10/2023    Prostate enlargement 6/10/2023     Past Surgical History:   Procedure Laterality Date    TONSILLECTOMY        General Information       Row Name 23 1546          Physical Therapy Time and Intention    Document Type evaluation  -CR     Mode of Treatment physical therapy  -CR       Row Name 23 1546          General Information    Patient Profile Reviewed yes  -CR     Prior Level of Function independent:;all household mobility;community mobility;driving;ADL's  -CR     Existing Precautions/Restrictions --  no lifting over 10 lbs  -CR     Barriers to Rehab none identified  -CR       Row Name 23 1546          Living Environment    People in Home alone  children will assist  -CR       Row Name 23 1546          Home Main Entrance    Number of Stairs, Main Entrance none  -CR       Row Name 23 1546          Stairs Within Home, Primary    Number of  Stairs, Within Home, Primary none  -CR       Row Name 06/11/23 1546          Cognition    Orientation Status (Cognition) oriented x 4  -CR       Row Name 06/11/23 1546          Safety Issues, Functional Mobility    Impairments Affecting Function (Mobility) pain;shortness of breath  -CR               User Key  (r) = Recorded By, (t) = Taken By, (c) = Cosigned By      Initials Name Provider Type    CR Reyes, Carmela, PT Physical Therapist                   Mobility       Row Name 06/11/23 1547          Bed Mobility    Bed Mobility supine-sit  -CR     Supine-Sit Sidney (Bed Mobility) independent  -CR       Row Name 06/11/23 1547          Bed-Chair Transfer    Bed-Chair [place] (Transfers) modified independence  -CR       Row Name 06/11/23 1547          Sit-Stand Transfer    Sit-Stand [place] (Transfers) modified independence  -CR       Row Name 06/11/23 1547          Gait/Stairs (Locomotion)    S[place]ney Level (Gait) standby assist  -CR     Distance in Feet (Gait) 300  -CR     Deviations/Abnormal Patterns (Gait) antalgic  -CR               User Key  (r) = Recorded By, (t) = Taken By, (c) = Cosigned By      Initials Name Provider Type    CR Reyes, Carmela, PT Physical Therapist                   Obj/Interventions       Row Name 06/11/23 1548          Range of Motion Comprehensive    General Range of Motion no range of motion deficits identified  -CR       Row Name 06/11/23 1548          Strength Comprehensive (MMT)    General Manual Muscle Testing (MMT) Assessment no strength deficits identified  -CR       Row Name 06/11/23 1548          Balance    Balance Assessment sitting static balance;sitting dynamic balance;sit to stand dynamic balance;standing static balance;standing dynamic balance  -CR     Static Sitting Balance independent  -CR     Dynamic Sitting Balance independent  -CR     Position, Sitting Balance sitting in chair;sitting edge of bed  -CR     Sit to Stand Dynamic Balance modified  independence  -CR     Static Standing Balance independent  -CR     Dynamic Standing Balance independent  -CR       Row Name 06/11/23 1548          Sensory Assessment (Somatosensory)    Sensory Assessment (Somatosensory) sensation intact  -CR               User Key  (r) = Recorded By, (t) = Taken By, (c) = Cosigned By      Initials Name Provider Type    CR Reyes, Carmela, DEANNE Physical Therapist                   Goals/Plan    No documentation.                  Clinical Impression       Row Name 06/11/23 1548          Pain    Pretreatment Pain Rating 2/10  -CR     Posttreatment Pain Rating 2/10  -CR     Pain Location - Side/Orientation Left  -CR     Pain Location - groin  -CR       Row Name 06/11/23 1548          Plan of Care Review    Plan of Care Reviewed With patient  -CR     Outcome Evaluation 78 y/o male came to hospital on 6/9 due to worsening scrotal pain and found to have L inguinal hernia s/p repair. Patient lives alone but will have family that can assist. At time of eval, patient is modified independent for bed mobility and transfers. Patient tolerated ambulation for 300 ft with SBA; noted slight limp and crossing of feet at times but able to self correct. Patient is near his baseline and should be safe to return home with family assist.  -CR       Row Name 06/11/23 1548          Therapy Assessment/Plan (PT)    Patient/Family Therapy Goals Statement (PT) home  -CR     Criteria for Skilled Interventions Met (PT) no;does not meet criteria for skilled intervention  -CR     Therapy Frequency (PT) evaluation only  -CR               User Key  (r) = Recorded By, (t) = Taken By, (c) = Cosigned By      Initials Name Provider Type    CR Reyes, Carmela, PT Physical Therapist                   Outcome Measures       Row Name 06/11/23 1557 06/11/23 0808       How much help from another person do you currently need...    Turning from your back to your side while in flat bed without using bedrails? 4  -CR 4  -MS    Moving  from lying on back to sitting on the side of a flat bed without bedrails? 4  -CR 4  -MS    Moving to and from a bed to a chair (including a wheelchair)? 4  -CR 4  -MS    Standing up from a chair using your arms (e.g., wheelchair, bedside chair)? 4  -CR 4  -MS    Climbing 3-5 steps with a railing? 4  -CR 4  -MS    To walk in hospital room? 4  -CR 4  -MS    AM-PAC 6 Clicks Score (PT) 24  -CR 24  -MS    Highest level of mobility 8 --> Walked 250 feet or more  -CR 8 --> Walked 250 feet or more  -MS              User Key  (r) = Recorded By, (t) = Taken By, (c) = Cosigned By      Initials Name Provider Type    CR Reyes, Carmela, PT Physical Therapist    Fernanda Le LPN Licensed Nurse                                   PT Recommendation and Plan     Plan of Care Reviewed With: patient  Outcome Evaluation: 78 y/o male came to hospital on 6/9 due to worsening scrotal pain and found to have L inguinal hernia s/p repair. Patient lives alone but will have family that can assist. At time of eval, patient is modified independent for bed mobility and transfers. Patient tolerated ambulation for 300 ft with SBA; noted slight limp and crossing of feet at times but able to self correct. Patient is near his baseline and should be safe to return home with family assist.     Time Calculation:    PT Charges       Row Name 06/11/23 1558             Time Calculation    Start Time 0943  -CR      Stop Time 0955  -CR      Time Calculation (min) 12 min  -CR      PT Received On 06/11/23  -CR         Time Calculation- PT    Total Timed Code Minutes- PT 0 minute(s)  -CR                User Key  (r) = Recorded By, (t) = Taken By, (c) = Cosigned By      Initials Name Provider Type    CR Reyes, Carmela, PT Physical Therapist                  Therapy Charges for Today       Code Description Service Date Service Provider Modifiers Qty    59239454322 HC PT EVAL LOW COMPLEXITY 3 6/11/2023 Reyes, Carmela, PT GP 1            PT G-Codes  AM-PAC 6  Clicks Score (PT): 24  PT Discharge Summary  Anticipated Discharge Disposition (PT): home with assist    Carmela Reyes, PT  6/11/2023

## 2023-06-11 NOTE — CASE MANAGEMENT/SOCIAL WORK
Case Management Discharge Note      Final Note: home             Transportation Services  Private: Car    Final Discharge Disposition Code: 01 - home or self-care

## 2023-06-11 NOTE — DISCHARGE SUMMARY
[facility] Medicine Services  Discharge Summary    Date of Service: 2023  Patient Name: Hadley Astudillo  : 1945  MRN: [id number]    Date of Admission: 2023  Discharge Diagnosis: see below  Date of Discharge:  2023  Primary Care Physician: Mary Reese APRN    Presenting Problem:   Scrotal pain [N50.82]  Left inguinal hernia [K40.90]  Abdominal wall cellulitis [L03.311]  Leukocytosis, unspecified type [D72.829]    Active and Resolved Hospital Problems:  Active Hospital Problems    Diagnosis POA   • History of robot-assisted repair of left inguinal hernia [Z98.890, Z87.19] Not Applicable   • Leukocytosis [D72.829] Yes   • Kidney displacement [N28.83] Yes   • Prostate enlargement [N40.0] Yes   • Essential hypertension [I10] Yes      Resolved Hospital Problems    Diagnosis POA   • **Incarcerated left inguinal hernia [K40.30] Yes   • Hypertensive urgency [I16.0] Yes     Hospital Course     HPI:  Hadley Astudillo is a 77 y.o. male with PMHx of HTN who presented to [facility] on 2023 complaining of abdominal pain.  Admits to lower abdominal pain radiating down to left testicle worsening over the past 2-3 days complicated by left groin bulge.  Denies chest pain, vomiting, dyspnea, hemoptysis, diarrhea.  As symptoms did not improved he presented to [facility].     In the Ed, vitals 97.7, Hr 58, RR 12, /97, 95 Ra.  Labs notable for wbc 20.2.  CT a/p showed large fat containing left inguinal hernia extending to the scrotal sac with strandy density within herniated sac seen with ischemia/inflammation complicated by left hydrocele.  Surgery contacted and patient to be taken to OR.  Hospitalist will admit for medical management.    Hospital course:  He was admitted in consultation with general surgery for operative management of what appeared to be an incarcerated left inguinal hernia.  He underwent Da Keith assisted laparoscopic left inguinal hernia repair, which was  completed without complications.  His white blood cell count remains elevated, and he received Zosyn perioperatively.  I will discharge him to complete another 5 days of Augmentin in the event that there was an infectious process underway.  He is tolerating a regular diet, and his mobility is excellent.  He has little to no pain.  His blood pressures have been quite elevated, however.  He tells me that they run pretty high in the outpatient setting but that he is not currently taking medication.  He has been on medication for blood pressure in the past.  Maximum dosed lisinopril and Norvasc were initiated.  He was also given a clonidine patch and required some IV breakthrough medication overnight.  Overall his blood pressures have improved reasonably enough for discharge.  Coreg was added at discharge in addition to the lisinopril and Norvasc.  I did not want to initiate diuretics in the immediate postoperative period in the event that his oral intake and hydration are less than optimal.  Thyroid studies and plasma metanephrine screening are pending at the time of discharge as is a screening PSA with note of prostate enlargement on his admission imaging.  He denied any significant urinary symptoms, so he was not started on Flomax or any similar medications.  He was encouraged to monitor his blood pressure and heart rate to make sure that there are no extremes at home.  He will follow-up with his primary care provider to titrate medications as necessary.  It may be prudent to repeat his echocardiogram to see if there has been any progression of any valvular disease or anything else that might contribute to his blood pressure issues.  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  This may deserve some outpatient follow-up as well and certainly could also be a contributor to his blood pressure issues.  He was encouraged to take his blood pressure medications as prescribed, as he quit taking  them in the past because he did not feel like they made a difference and he did not feel poorly at the time. He will discharge home today.    Day of Discharge     Vital Signs:  Temp:  [97.8 °F (36.6 °C)-98.6 °F (37 °C)] 97.9 °F (36.6 °C)  Heart Rate:  [59-78] 72  Resp:  [14-17] 14  BP: (145-220)/() 161/83  Flow (L/min):  [3] 3    Physical Exam:  GEN: WDWN, no acute distress  HEENT: NCAT, PERRLA, moist mucous membranes  NECK: Supple, midline trachea  CARD: RRR, no peripheral edema  PULM: Fairly CTAB, non-distressed  ABD: soft, appropriate postoperative tenderness, trocar sites look good with no signs of infection, normoactive bowel sounds  NEURO: Grossly intact, non-focal exam  PSYCH: Pleasant    Pertinent  and/or Most Recent Results     LAB RESULTS:      Lab 06/10/23  2254 06/10/23  0032 06/09/23  1117   WBC 21.90* 19.30* 20.20*   HEMOGLOBIN 14.8 14.1 15.7   HEMATOCRIT 45.6 43.5 47.0   PLATELETS 298 267 277   NEUTROS ABS  --   --  4.24   EOS ABS  --   --  0.40   MCV 92.7 92.5 90.7         Lab 06/10/23  2254 06/10/23  0032 06/09/23  1117   SODIUM 144 140 140   POTASSIUM 4.0 4.4 4.3   CHLORIDE 107 105 105   CO2 25.0 26.0 25.0   ANION GAP 12.0 9.0 10.0   BUN 18 15 15   CREATININE 1.17 0.90 0.75*   EGFR 64.2 88.0 92.9   GLUCOSE 95 130* 95   CALCIUM 9.0 8.6 9.3         Lab 06/09/23  1117   TOTAL PROTEIN 7.0   ALBUMIN 4.4   GLOBULIN 2.6   ALT (SGPT) 20   AST (SGOT) 23   BILIRUBIN 0.6   ALK PHOS 63   LIPASE 24                     Brief Urine Lab Results  (Last result in the past 365 days)      Color   Clarity   Blood   Leuk Est   Nitrite   Protein   CREAT   Urine HCG        06/09/23 1315 Yellow   Clear   Negative   Negative   Negative   Negative               Microbiology Results (last 10 days)     Procedure Component Value - Date/Time    KOH Prep - Swab, Groin [[id number]] Collected: 06/09/23 1105    Lab Status: Final result Specimen: Swab from Groin Updated: 06/09/23 1127     KOH Prep No yeast or hyphal elements  seen          CT Abdomen Pelvis With Contrast    Result Date: 6/9/2023  Impression: Impression: 1. Large fat-containing left inguinal hernia extending to the scrotal sac. There is strandy density within the herniated fat which can be seen with ischemia/inflammation. There is a left hydrocele. 2. There is a small fat density right inguinal hernia. 3. Enlarged prostate gland presumably secondary to BPH. 4. Mild colonic diverticulosis without evidence of acute diverticulitis. 5. 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 6. Partially imaged fat density mass in the left rectus muscle consistent with intramuscular hematoma. 7. Additional findings as noted above. Electronically Signed: Brannon Freeman  6/9/2023 1:02 PM EDT  Workstation ID: [id number]      Results for orders placed during the hospital encounter of 04/15/21    Adult Transthoracic Echo Complete W/ Color, Spectral and Contrast if Necessary Per Protocol    Interpretation Summary  · Left ventricular wall thickness is consistent with mild concentric hypertrophy.  · Left ventricular ejection fraction appears to be 61 - 65%. Left ventricular systolic function is normal.  · The right ventricular cavity is mild to moderately dilated.      Labs Pending at Discharge:  Pending Labs     Order Current Status    PSA Screen In process    T4, Free In process    TSH In process          Procedures Performed  Procedure(s):  INGUINAL HERNIA REPAIR LAPAROSCOPIC WITH DAVINCI ROBOT       Consults:   Consults     Date and Time Order Name Status Description    6/9/2023  1:48 PM Hospitalist (on-call MD unless specified)      6/9/2023  1:13 PM Surgery (on-call MD unless specified) Completed         Discharge Details        Discharge Medications      New Medications      Instructions Start Date   amLODIPine 5 MG tablet  Commonly known as: NORVASC   5 mg, Oral, 2 Times Daily      amoxicillin-clavulanate 875-125 MG per tablet  Commonly known as: AUGMENTIN   1 tablet, Oral, 2 Times Daily       carvedilol 6.25 MG tablet  Commonly known as: Coreg   6.25 mg, Oral, 2 Times Daily With Meals      lisinopril 40 MG tablet  Commonly known as: PRINIVIL,ZESTRIL   40 mg, Oral, Every 24 Hours Scheduled   Start Date: June 12, 2023     oxyCODONE 5 MG immediate release tablet  Commonly known as: ROXICODONE   5 mg, Oral, Every 6 Hours PRN      polyethylene glycol 17 g packet  Commonly known as: MIRALAX   17 g, Oral, Daily PRN         Continue These Medications      Instructions Start Date   Loratadine 10 MG capsule   Oral      multivitamin with minerals tablet tablet   1 tablet, Oral, Daily         Stop These Medications    [employer] Artificial Tears 1-0.3 % solution  Generic drug: Propylene Glycol-Glycerin     Systane Nighttime ointment ophthalmic ointment     tobramycin-dexamethasone 0.3-0.1 % ophthalmic suspension  Commonly known as: TOBRADEX          No Known Allergies    Discharge Disposition:   Home or Self Care    Diet:  Hospital:  Diet Order   Procedures   • Diet: Regular/House Diet; Texture: Regular Texture (IDDSI 7); Fluid Consistency: Thin (IDDSI 0)     Discharge Activity:   Activity Instructions     Activity as Tolerated      Gradually Increase Activity Until at Pre-Hospitalization Level      Measure Blood Pressure      Measure blood pressure and heart rate daily at home and take this information to all follow-up appointments for provider review.  Make sure to notify your primary care provider immediately for persistent heart rate below 60, persistently low blood pressure < 90/60, or persistently high blood pressure >170/90.    Other Activity Instructions      Mobility restrictions as per Surgery recommendations        CODE STATUS:  Code Status and Medical Interventions:   Ordered at: 06/09/23 2023     Code Status (Patient has no pulse and is not breathing):    CPR (Attempt to Resuscitate)     Medical Interventions (Patient has pulse or is breathing):    Full Support     No future appointments.    Additional  Instructions for the Follow-ups that You Need to Schedule     Call MD With Problems / Concerns   As directed      Instructions: PCM vs Surgery as appropriate    Order Comments: Instructions: PCM vs Surgery as appropriate          Discharge Follow-up with PCP   As directed       Currently Documented PCP:    Mary Reese APRN    PCP Phone Number:    [phone]     Follow Up Details: within one week of discharge         Discharge Follow-up with Specialty: Surgery follow up as per their service   As directed      Specialty: Surgery follow up as per their service             Time spent on Discharge including face to face service: 25 minutes    Signature: Electronically signed by Sascha Stevens MD, 06/11/23, 09:11 EDT.  [facility] Hospitalist Team

## 2023-06-11 NOTE — PLAN OF CARE
Goal Outcome Evaluation:  Plan of Care Reviewed With: patient           Outcome Evaluation: 76 y/o male came to hospital on 6/9 due to worsening scrotal pain and found to have L inguinal hernia s/p repair. Patient lives alone but will have family that can assist. At time of eval, patient is modified independent for bed mobility and transfers. Patient tolerated ambulation for 300 ft with SBA; noted slight limp and crossing of feet at times but able to self correct. Patient is near his baseline and should be safe to return home with family assist.

## 2023-06-11 NOTE — PLAN OF CARE
Goal Outcome Evaluation:      Pt is able to make needs known. No c/o pain at this time. Pt is independent with ADL's. Education is complete, call light is in reach, and no other needs at this time. Will continue to monitor.

## 2023-06-11 NOTE — PLAN OF CARE
Goal Outcome Evaluation:      Patient able to make needs known. Ambulates independently in room. Vss on 2 liters O2 per NC. IV antibiotics continued. No complaints this shift. Patient resting in bed comfortably at this time. Personal items and call light with in reach. Plan of care on going.

## 2023-06-14 ENCOUNTER — TELEPHONE (OUTPATIENT)
Dept: SURGERY | Facility: CLINIC | Age: 78
End: 2023-06-14
Payer: MEDICARE

## 2023-06-14 NOTE — TELEPHONE ENCOUNTER
Called Hadley Astudillo to check PO status.  Hadley Astudillo reports doing well.  PO appt scheduled.

## 2023-06-17 LAB
METANEPH FREE SERPL-MCNC: 32.5 PG/ML (ref 0–88)
NORMETANEPHRINE SERPL-MCNC: 74.4 PG/ML (ref 0–285.2)

## (undated) DEVICE — DRAPE SHEET ULTRAGARD: Brand: MEDLINE

## (undated) DEVICE — SHEET,DRAPE,53X77,STERILE: Brand: MEDLINE

## (undated) DEVICE — TRENDELENBURG ARM PROTECTORS - STANDARD: Brand: SOULE MEDICAL

## (undated) DEVICE — TIP COVER ACCESSORY

## (undated) DEVICE — CVR HNDL LT SURG ACCSSRY BLU STRL

## (undated) DEVICE — BLANKT WARM UPPR/BDY ARM/OUT 57X196CM

## (undated) DEVICE — TROC BLADLES AIRSEAL/OPTI THRD 8X120MM 1P/U

## (undated) DEVICE — GENERAL LAPAROSCOPY CDS: Brand: MEDLINE INDUSTRIES, INC.

## (undated) DEVICE — UNDRGLV SURG BIOGEL PIMICROINDICATOR SYNTH SZ8 LF STRL

## (undated) DEVICE — 40580 - THE PINK PAD - ADVANCED TRENDELENBURG POSITIONING KIT: Brand: 40580 - THE PINK PAD - ADVANCED TRENDELENBURG POSITIONING KIT

## (undated) DEVICE — SYR LUERLOK 30CC

## (undated) DEVICE — SLV SCD CALF HEMOFORCE DVT THERP REPROC MD

## (undated) DEVICE — LAPAROVUE VISIBILITY SYSTEM LAPAROSCOPIC SOLUTIONS: Brand: LAPAROVUE

## (undated) DEVICE — SUCTION IRRIGATOR: Brand: ENDOWRIST

## (undated) DEVICE — ST TBG AIRSEAL BIF FLTR W/ACT/CHARCOAL/FLTR

## (undated) DEVICE — ANTIBACTERIAL UNDYED BRAIDED (POLYGLACTIN 910), SYNTHETIC ABSORBABLE SUTURE: Brand: COATED VICRYL

## (undated) DEVICE — CANNULA SEAL

## (undated) DEVICE — NDL HYPO PRECISIONGLIDE/REG 18G 11/2 PNK

## (undated) DEVICE — ENDOPATH XCEL WITH OPTIVIEW TECHNOLOGY BLADELESS TROCARS WITH STABILITY SLEEVES: Brand: ENDOPATH XCEL OPTIVIEW

## (undated) DEVICE — SOLUTION,WATER,IRRIGATION,1000ML,STERILE: Brand: MEDLINE

## (undated) DEVICE — GLV SURG SIGNATURE ESSENTIAL PF LTX SZ7.5

## (undated) DEVICE — BLADELESS OBTURATOR: Brand: WECK VISTA

## (undated) DEVICE — ARM DRAPE

## (undated) DEVICE — COLUMN DRAPE

## (undated) DEVICE — KT SURG TURNOVER 050

## (undated) DEVICE — ADHS SKIN PREMIERPRO EXOFIN TOPICAL HI/VISC .5ML

## (undated) DEVICE — TOTAL TRAY, DB, 100% SILI FOLEY, 16FR 10: Brand: MEDLINE